# Patient Record
Sex: FEMALE | Race: BLACK OR AFRICAN AMERICAN | RURAL
[De-identification: names, ages, dates, MRNs, and addresses within clinical notes are randomized per-mention and may not be internally consistent; named-entity substitution may affect disease eponyms.]

---

## 2023-01-10 ENCOUNTER — OFFICE VISIT (OUTPATIENT)
Dept: FAMILY MEDICINE CLINIC | Age: 58
End: 2023-01-10
Payer: COMMERCIAL

## 2023-01-10 VITALS
HEART RATE: 75 BPM | SYSTOLIC BLOOD PRESSURE: 186 MMHG | TEMPERATURE: 96.7 F | HEIGHT: 65 IN | RESPIRATION RATE: 16 BRPM | DIASTOLIC BLOOD PRESSURE: 82 MMHG | BODY MASS INDEX: 48.82 KG/M2 | WEIGHT: 293 LBS | OXYGEN SATURATION: 94 %

## 2023-01-10 DIAGNOSIS — Z13.220 SCREENING FOR LIPID DISORDERS: ICD-10-CM

## 2023-01-10 DIAGNOSIS — J45.909 UNCOMPLICATED ASTHMA, UNSPECIFIED ASTHMA SEVERITY, UNSPECIFIED WHETHER PERSISTENT: ICD-10-CM

## 2023-01-10 DIAGNOSIS — Z11.59 NEED FOR HEPATITIS C SCREENING TEST: ICD-10-CM

## 2023-01-10 DIAGNOSIS — G47.00 INSOMNIA, UNSPECIFIED TYPE: ICD-10-CM

## 2023-01-10 DIAGNOSIS — Z13.31 POSITIVE DEPRESSION SCREENING: ICD-10-CM

## 2023-01-10 DIAGNOSIS — E11.9 TYPE 2 DIABETES MELLITUS WITHOUT COMPLICATION, WITHOUT LONG-TERM CURRENT USE OF INSULIN (HCC): ICD-10-CM

## 2023-01-10 DIAGNOSIS — I10 BENIGN HYPERTENSION: Primary | ICD-10-CM

## 2023-01-10 DIAGNOSIS — Z79.899 ENCOUNTER FOR LONG-TERM (CURRENT) USE OF OTHER MEDICATIONS: ICD-10-CM

## 2023-01-10 LAB — HBA1C MFR BLD HPLC: 7.2 %

## 2023-01-10 PROCEDURE — 3077F SYST BP >= 140 MM HG: CPT | Performed by: FAMILY MEDICINE

## 2023-01-10 PROCEDURE — 3051F HG A1C>EQUAL 7.0%<8.0%: CPT | Performed by: FAMILY MEDICINE

## 2023-01-10 PROCEDURE — 99204 OFFICE O/P NEW MOD 45 MIN: CPT | Performed by: FAMILY MEDICINE

## 2023-01-10 PROCEDURE — 83036 HEMOGLOBIN GLYCOSYLATED A1C: CPT | Performed by: FAMILY MEDICINE

## 2023-01-10 PROCEDURE — 3079F DIAST BP 80-89 MM HG: CPT | Performed by: FAMILY MEDICINE

## 2023-01-10 RX ORDER — TRAZODONE HYDROCHLORIDE 100 MG/1
TABLET ORAL
COMMUNITY
Start: 2022-11-28 | End: 2023-01-10 | Stop reason: ALTCHOICE

## 2023-01-10 RX ORDER — HYDROXYZINE 25 MG/1
25 TABLET, FILM COATED ORAL
Qty: 30 TABLET | Refills: 1 | Status: SHIPPED | OUTPATIENT
Start: 2023-01-10

## 2023-01-10 RX ORDER — MONTELUKAST SODIUM 10 MG/1
TABLET ORAL
COMMUNITY
Start: 2022-11-28 | End: 2023-01-10

## 2023-01-10 RX ORDER — HYDROCHLOROTHIAZIDE 12.5 MG/1
12.5 TABLET ORAL DAILY
Qty: 30 TABLET | Refills: 1 | Status: SHIPPED | OUTPATIENT
Start: 2023-01-10

## 2023-01-10 RX ORDER — DULAGLUTIDE 1.5 MG/.5ML
1.5 INJECTION, SOLUTION SUBCUTANEOUS
Qty: 4 EACH | Refills: 5 | Status: SHIPPED | OUTPATIENT
Start: 2023-01-10

## 2023-01-10 RX ORDER — PAROXETINE 10 MG/1
10 TABLET, FILM COATED ORAL DAILY
Qty: 30 TABLET | Refills: 2 | Status: SHIPPED | OUTPATIENT
Start: 2023-01-10

## 2023-01-10 RX ORDER — LOSARTAN POTASSIUM 50 MG/1
TABLET ORAL
COMMUNITY
Start: 2022-11-28

## 2023-01-10 RX ORDER — POLYETHYLENE GLYCOL 3350 17 G/17G
17 POWDER, FOR SOLUTION ORAL
COMMUNITY
Start: 2022-07-12

## 2023-01-10 NOTE — PROGRESS NOTES
[facility]    History of Present Illness:   Lana Lemus is a 65 y.o. female here for   Chief Complaint   Patient presents with    Establish Care         HPI:  Here to establish care for htn. Avg 130s-140s bp at home. She has not taken her losartan yet today. She says she was previously treated with hydrochlorothiazide and hydralazine as well. She last saw eye doctor 5 months ago and was told she was prediabetic. She was given metformin but it caused diarrhea. She denies any family history of thyroid cancer. She has a history of anxiety and was given Zoloft in the past but it caused palpitations. She took Paxil previously and tolerated that well. She says she takes trazodone for sleep. She had a sleep study done recently at [place]. They told her she could use CPAP but she decided against it. She says she has trouble staying asleep not falling asleep. Health Maintenance  Health Maintenance Due   Topic Date Due    Hepatitis C Screening  Never done    Pneumococcal 0-64 years (1 - PCV) Never done    Depression Monitoring  Never done    Cervical cancer screen  Never done    DTaP/Tdap/Td series (1 - Tdap) 07/06/2006    Lipid Screen  Never done    Colorectal Cancer Screening Combo  Never done    Shingles Vaccine (1 of 2) Never done    Breast Cancer Screen Mammogram  Never done    COVID-19 Vaccine (3 - Booster) 03/05/2021    Flu Vaccine (1) 08/01/2022       Past Medical, Family, and Social History:     Past Medical History:   Diagnosis Date    Asthma     Depression     Hypertension     Migraine headache       Past Surgical History:   Procedure Laterality Date    HX APPENDECTOMY      HX CHOLECYSTECTOMY      HX GYN         Current Outpatient Medications   Medication Sig Dispense Refill    losartan (COZAAR) 50 mg tablet TAKE ONE TABLET BY MOUTH EVERY DAY FOR BLOOD PRESSURE control FOR 90 DAYS      polyethylene glycol (MIRALAX) 17 gram/dose powder Take 17 g by mouth.          2 1       0    trazodone 100 mg qhs for sleep  5       1    FLUTICASONE PROPIONATE (FLUTICASONE IN) Take  by inhalation. BUDESONIDE/FORMOTEROL FUMARATE (SYMBICORT IN) Take  by inhalation. ALBUTEROL SULFATE (VENTOLIN IN) Take  by inhalation. Patient Active Problem List   Diagnosis Code    Migraine headache G43.909    Depression F32. A    Benign hypertension I10    Asthma J45.909    Obstructive sleep apnea (adult) (pediatric) G47.33    Type 2 diabetes mellitus without complication, without long-term current use of insulin (HCC) E11.9    Insomnia G47.00       Social History     Socioeconomic History    Marital status:    Tobacco Use    Smoking status: Never    Smokeless tobacco: Never   Substance and Sexual Activity    Alcohol use: No    Drug use: Not Currently     Social Determinants of Health     Financial Resource Strain: Low Risk     Difficulty of Paying Living Expenses: Not hard at all   Food Insecurity: No Food Insecurity    Worried About Running Out of Food in the Last Year: Never true    Ran Out of Food in the Last Year: Never true        Review of Systems   Review of Systems   Constitutional:  Negative for chills and fever. Respiratory:  Positive for shortness of breath. Negative for cough and wheezing. Cardiovascular:  Negative for chest pain. Gastrointestinal:  Negative for abdominal pain.      Objective:   Visit Vitals  BP (!) 186/82   Pulse 75   Temp (!) 96.7 °F (35.9 °C) (Oral)   Resp 16   Ht 5' 5\" (1.651 m)   Wt 298 lb 3.2 oz (135.3 kg)   SpO2 94%   BMI 49.62 kg/m²        Physical Exam  PHYSICAL EXAM:  Gen: Pt sitting in chair, in NAD  Head: Normocephalic, atraumatic  Eyes: Sclera anicteric, EOM grossly intact,  Ears: TM's pearly with good light reflex b/l  Neck: Supple, no LAD, no thyromegaly or carotid bruits  CVS: Normal S1, S2, no m/r/g  Resp: CTAB, no wheezes or rales  Abd: Soft, non-tender, non-distended, +BS  Neuro: Alert, oriented, appropriate    Pertinent Labs/Studies:  3 most recent PHQ Screens 1/10/2023   Little interest or pleasure in doing things More than half the days   Feeling down, depressed, irritable, or hopeless More than half the days   Total Score PHQ 2 4   Trouble falling or staying asleep, or sleeping too much Several days   Feeling tired or having little energy More than half the days   Poor appetite, weight loss, or overeating Several days   Feeling bad about yourself - or that you are a failure or have let yourself or your family down Not at all   Trouble concentrating on things such as school, work, reading, or watching TV Several days   Moving or speaking so slowly that other people could have noticed; or the opposite being so fidgety that others notice More than half the days   Thoughts of being better off dead, or hurting yourself in some way Not at all   PHQ 9 Score 11   How difficult have these problems made it for you to do your work, take care of your home and get along with others Not difficult at all      Testing preformed onsite at today's visit:  Results for orders placed or performed in visit on 01/10/23   AMB POC HEMOGLOBIN A1C   Result Value Ref Range    Hemoglobin A1c (POC) 7.2 %       Assessment and orders:       ICD-10-CM ICD-9-CM    1. Benign hypertension  I10 401.1 losartan (COZAAR) 50 mg tablet      hydroCHLOROthiazide (HYDRODIURIL) 12.5 mg tablet      2. Type 2 diabetes mellitus without complication, without long-term current use of insulin (HCC)  E11.9 250.00 AMB POC HEMOGLOBIN A1C      COLLECTION CAPILLARY BLOOD SPECIMEN      dulaglutide (Trulicity) 1.5 QS/0.1 mL sub-q pen      3. Insomnia, unspecified type  G47.00 780.52 hydrOXYzine HCL (ATARAX) 25 mg tablet      4. Positive depression screening  Z13.31 796.4 PARoxetine (PAXIL) 10 mg tablet      5. Need for hepatitis C screening test  Z11.59 V73.89 HEPATITIS C AB      HEPATITIS C AB      6.  Encounter for long-term (current) use of other medications Z79.899 V58.69 CBC WITH AUTOMATED DIFF      METABOLIC PANEL, COMPREHENSIVE      METABOLIC PANEL, COMPREHENSIVE      CBC WITH AUTOMATED DIFF      7. Screening for lipid disorders  Z13.220 V77.91 LIPID PANEL      LIPID PANEL      8. Uncomplicated asthma, unspecified asthma severity, unspecified whether persistent  J45.909 493.90 pneumococcal 20-sunny conj-dip, PF, (PREVNAR 20) 0.5 mL syrg injection        Diagnoses and all orders for this visit:    1. Benign hypertension  Assessment & Plan:   uncontrolled, changes made today: Add hydrochlorothiazide    Orders:  -     hydroCHLOROthiazide (HYDRODIURIL) 12.5 mg tablet; Take 1 Tablet by mouth daily. 2. Type 2 diabetes mellitus without complication, without long-term current use of insulin ([facility]  Assessment & Plan:   borderline controlled, changes made today: Trial of Trulicity if insurance will cover. Failed metformin due to side effects     GLP-1 agonists are contraindicated in patients with a personal or family history of MTC or in patients with Multiple Endocrine Neoplasia syndrome type 2 (MEN 2). Counseled patient regarding the potential risk for MTC with the use of semaglutide and informed them of symptoms of thyroid tumors (eg, a mass in the neck, dysphagia, dyspnea, persistent hoarseness). Orders:  -     AMB POC HEMOGLOBIN A1C  -     COLLECTION CAPILLARY BLOOD SPECIMEN  -     dulaglutide (Trulicity) 1.5 VV/8.6 mL sub-q pen; 0.5 mL by SubCUTAneous route every seven (7) days. Indications: type 2 diabetes mellitus    3. Insomnia, unspecified type  -     hydrOXYzine HCL (ATARAX) 25 mg tablet; Take 1 Tablet by mouth nightly as needed for Sleep. 4. Positive depression screening  -     PARoxetine (PAXIL) 10 mg tablet; Take 1 Tablet by mouth daily. 5. Need for hepatitis C screening test  -     HEPATITIS C AB; Future    6. Encounter for long-term (current) use of other medications  -     CBC WITH AUTOMATED DIFF;  Future  -     METABOLIC PANEL, COMPREHENSIVE; Future    7. Screening for lipid disorders  -     LIPID PANEL; Future    8. Uncomplicated asthma, unspecified asthma severity, unspecified whether persistent  -     pneumococcal 20-sunny conj-dip, PF, (PREVNAR 20) 0.5 mL syrg injection; 0.5 mL by IntraMUSCular route once for 1 dose. Follow-up and Dispositions    Return in about 2 weeks (around 1/24/2023) for Hypertension. the following changes in treatment are made: Add hydrochlorothiazide back, stop trazodone and start Paxil and hydroxyzine. Start Trulicity    lab results and schedule of future lab studies reviewed with patient  reviewed diet, exercise and weight control  reviewed medications and side effects in detail  specific diabetic recommendations: low cholesterol diet, weight control and daily exercise discussed, all medications, side effects and compliance discussed carefully, and glycohemoglobin and other lab monitoring discussed  Signed for records release today. I have discussed the diagnosis with the patient and the intended plan as seen in the above orders. Social history, medical history, and labs were reviewed. The patient has received an after-visit summary and questions were answered concerning future plans. I have discussed medication side effects and warnings with the patient as well. Patient/guardian verbalized understanding and accepts plan & risks. Please note that this dictation was completed with [employer], the computer voice recognition software. Quite often unanticipated grammatical, syntax, homophones, and other interpretive errors are inadvertently transcribed by the computer software. Please disregard these errors. Please excuse any errors that have escaped final proofreading. Thank you.      MD JIMENEZ Camejo & RAJENDRA CRUZ [facility]  01/10/23      Depression screen positive, PHQ 9 Score: 11, additional evaluation and assessment performed, follow-up plan includes but not limited to: Medication management

## 2023-01-10 NOTE — ASSESSMENT & PLAN NOTE
borderline controlled, changes made today: Trial of Trulicity if insurance will cover. Failed metformin due to side effects     GLP-1 agonists are contraindicated in patients with a personal or family history of MTC or in patients with Multiple Endocrine Neoplasia syndrome type 2 (MEN 2). Counseled patient regarding the potential risk for MTC with the use of semaglutide and informed them of symptoms of thyroid tumors (eg, a mass in the neck, dysphagia, dyspnea, persistent hoarseness).

## 2023-01-10 NOTE — PATIENT INSTRUCTIONS
DASH Diet: Care Instructions  Your Care Instructions     The DASH diet is an eating plan that can help lower your blood pressure. DASH stands for Dietary Approaches to Stop Hypertension. Hypertension is high blood pressure. The DASH diet focuses on eating foods that are high in calcium, potassium, and magnesium. These nutrients can lower blood pressure. The foods that are highest in these nutrients are fruits, vegetables, low-fat dairy products, nuts, seeds, and legumes. But taking calcium, potassium, and magnesium supplements instead of eating foods that are high in those nutrients does not have the same effect. The DASH diet also includes whole grains, fish, and poultry. The DASH diet is one of several lifestyle changes your doctor may recommend to lower your high blood pressure. Your doctor may also want you to decrease the amount of sodium in your diet. Lowering sodium while following the DASH diet can lower blood pressure even further than just the DASH diet alone. Follow-up care is a key part of your treatment and safety. Be sure to make and go to all appointments, and call your doctor if you are having problems. It's also a good idea to know your test results and keep a list of the medicines you take. How can you care for yourself at home? Following the DASH diet  Eat 4 to 5 servings of fruit each day. A serving is 1 medium-sized piece of fruit, ½ cup chopped or canned fruit, 1/4 cup dried fruit, or 4 ounces (½ cup) of fruit juice. Choose fruit more often than fruit juice. Eat 4 to 5 servings of vegetables each day. A serving is 1 cup of lettuce or raw leafy vegetables, ½ cup of chopped or cooked vegetables, or 4 ounces (½ cup) of vegetable juice. Choose vegetables more often than vegetable juice. Get 2 to 3 servings of low-fat and fat-free dairy each day. A serving is 8 ounces of milk, 1 cup of yogurt, or 1 ½ ounces of cheese. Eat 6 to 8 servings of grains each day.  A serving is 1 slice of bread, 1 ounce of dry cereal, or ½ cup of cooked rice, pasta, or cooked cereal. Try to choose whole-grain products as much as possible. Limit lean meat, poultry, and fish to 2 servings each day. A serving is 3 ounces, about the size of a deck of cards. Eat 4 to 5 servings of nuts, seeds, and legumes (cooked dried beans, lentils, and split peas) each week. A serving is 1/3 cup of nuts, 2 tablespoons of seeds, or ½ cup of cooked beans or peas. Limit fats and oils to 2 to 3 servings each day. A serving is 1 teaspoon of vegetable oil or 2 tablespoons of salad dressing. Limit sweets and added sugars to 5 servings or less a week. A serving is 1 tablespoon jelly or jam, ½ cup sorbet, or 1 cup of lemonade. Eat less than 2,300 milligrams (mg) of sodium a day. If you limit your sodium to 1,500 mg a day, you can lower your blood pressure even more. Be aware that all of these are the suggested number of servings for people who eat 1,800 to 2,000 calories a day. Your recommended number of servings may be different if you need more or fewer calories. Tips for success  Start small. Do not try to make dramatic changes to your diet all at once. You might feel that you are missing out on your favorite foods and then be more likely to not follow the plan. Make small changes, and stick with them. Once those changes become habit, add a few more changes. Try some of the following:  Make it a goal to eat a fruit or vegetable at every meal and at snacks. This will make it easy to get the recommended amount of fruits and vegetables each day. Try yogurt topped with fruit and nuts for a snack or healthy dessert. Add lettuce, tomato, cucumber, and onion to sandwiches. Combine a ready-made pizza crust with low-fat mozzarella cheese and lots of vegetable toppings. Try using tomatoes, squash, spinach, broccoli, carrots, cauliflower, and onions.   Have a variety of cut-up vegetables with a low-fat dip as an appetizer instead of chips and dip.  Sprinkle sunflower seeds or chopped almonds over salads. Or try adding chopped walnuts or almonds to cooked vegetables. Try some vegetarian meals using beans and peas. Add garbanzo or kidney beans to salads. Make burritos and tacos with mashed machado beans or black beans. Where can you learn more? Go to http://www.sotelo.com/  Enter H967 in the search box to learn more about \"DASH Diet: Care Instructions. \"  Current as of: January 10, 2022               Content Version: 13.4  © 6495-7561 [employer]. Care instructions adapted under license by [employer] (which disclaims liability or warranty for this information). If you have questions about a medical condition or this instruction, always ask your healthcare professional. [address] any warranty or liability for your use of this information.

## 2023-01-10 NOTE — PROGRESS NOTES
1. Have you been to the ER, urgent care clinic since your last visit? Hospitalized since your last visit? No    2. Have you seen or consulted any other health care providers outside of the [address] [place] [address] since your last visit? Include any pap smears or colon screening. Yes When: [facility], dr Orlando Chaudhari gastro [facility]     3. For patients aged 39-70: Has the patient had a colonoscopy / FIT/ Cologuard? Yes - dr Hamida Rodriguez     If the patient is female:    4. For patients aged 41-77: Has the patient had a mammogram within the past 2 years? [address]       5. For patients aged 21-65: Has the patient had a pap smear? [address]  last 5 years     Reviewed record in preparation for visit and have necessary documentation  Pt did not bring medication to office visit for review  Patient is accompanied by self I have received verbal consent from Earnesteen Presume to discuss any/all medical information while they are present in the room.     Goals that were addressed and/or need to be completed during or after this appointment include     Health Maintenance Due   Topic Date Due    Hepatitis C Screening  Never done    COVID-19 Vaccine (1) Never done    Pneumococcal 0-64 years (1 - PCV) Never done    Depression Monitoring  Never done    Cervical cancer screen  Never done    DTaP/Tdap/Td series (1 - Tdap) 07/06/2006    Lipid Screen  Never done    Colorectal Cancer Screening Combo  Never done    Shingles Vaccine (1 of 2) Never done    Breast Cancer Screen Mammogram  Never done    Flu Vaccine (1) 08/01/2022

## 2023-01-11 LAB
ALBUMIN SERPL-MCNC: 3.9 G/DL (ref 3.5–5)
ALBUMIN/GLOB SERPL: 1.2 (ref 1.1–2.2)
ALP SERPL-CCNC: 102 U/L (ref 45–117)
ALT SERPL-CCNC: 26 U/L (ref 12–78)
ANION GAP SERPL CALC-SCNC: 4 MMOL/L (ref 5–15)
AST SERPL-CCNC: 15 U/L (ref 15–37)
BASOPHILS # BLD: 0.1 K/UL (ref 0–0.1)
BASOPHILS NFR BLD: 1 % (ref 0–1)
BILIRUB SERPL-MCNC: 0.6 MG/DL (ref 0.2–1)
BUN SERPL-MCNC: 12 MG/DL (ref 6–20)
BUN/CREAT SERPL: 14 (ref 12–20)
CALCIUM SERPL-MCNC: 9.6 MG/DL (ref 8.5–10.1)
CHLORIDE SERPL-SCNC: 106 MMOL/L (ref 97–108)
CHOLEST SERPL-MCNC: 247 MG/DL
CO2 SERPL-SCNC: 30 MMOL/L (ref 21–32)
CREAT SERPL-MCNC: 0.84 MG/DL (ref 0.55–1.02)
DIFFERENTIAL METHOD BLD: ABNORMAL
EOSINOPHIL # BLD: 0.1 K/UL (ref 0–0.4)
EOSINOPHIL NFR BLD: 1 % (ref 0–7)
ERYTHROCYTE [DISTWIDTH] IN BLOOD BY AUTOMATED COUNT: 15.2 % (ref 11.5–14.5)
GLOBULIN SER CALC-MCNC: 3.2 G/DL (ref 2–4)
GLUCOSE SERPL-MCNC: 110 MG/DL (ref 65–100)
HCT VFR BLD AUTO: 41.4 % (ref 35–47)
HCV AB SERPL QL IA: NONREACTIVE
HDLC SERPL-MCNC: 57 MG/DL
HDLC SERPL: 4.3 (ref 0–5)
HGB BLD-MCNC: 12.9 G/DL (ref 11.5–16)
IMM GRANULOCYTES # BLD AUTO: 0 K/UL (ref 0–0.04)
IMM GRANULOCYTES NFR BLD AUTO: 0 % (ref 0–0.5)
LDLC SERPL CALC-MCNC: 173 MG/DL (ref 0–100)
LYMPHOCYTES # BLD: 1.6 K/UL (ref 0.8–3.5)
LYMPHOCYTES NFR BLD: 23 % (ref 12–49)
MCH RBC QN AUTO: 25.4 PG (ref 26–34)
MCHC RBC AUTO-ENTMCNC: 31.2 G/DL (ref 30–36.5)
MCV RBC AUTO: 81.7 FL (ref 80–99)
MONOCYTES # BLD: 0.3 K/UL (ref 0–1)
MONOCYTES NFR BLD: 5 % (ref 5–13)
NEUTS SEG # BLD: 4.9 K/UL (ref 1.8–8)
NEUTS SEG NFR BLD: 70 % (ref 32–75)
NRBC # BLD: 0 K/UL (ref 0–0.01)
NRBC BLD-RTO: 0 PER 100 WBC
PLATELET # BLD AUTO: 305 K/UL (ref 150–400)
PMV BLD AUTO: 11.6 FL (ref 8.9–12.9)
POTASSIUM SERPL-SCNC: 4.5 MMOL/L (ref 3.5–5.1)
PROT SERPL-MCNC: 7.1 G/DL (ref 6.4–8.2)
RBC # BLD AUTO: 5.07 M/UL (ref 3.8–5.2)
SODIUM SERPL-SCNC: 140 MMOL/L (ref 136–145)
TRIGL SERPL-MCNC: 85 MG/DL (ref ?–150)
VLDLC SERPL CALC-MCNC: 17 MG/DL
WBC # BLD AUTO: 6.9 K/UL (ref 3.6–11)

## 2023-01-12 ENCOUNTER — TELEPHONE (OUTPATIENT)
Dept: FAMILY MEDICINE CLINIC | Age: 58
End: 2023-01-12

## 2023-01-12 DIAGNOSIS — E78.2 MIXED HYPERLIPIDEMIA: Primary | ICD-10-CM

## 2023-01-12 RX ORDER — ATORVASTATIN CALCIUM 20 MG/1
20 TABLET, FILM COATED ORAL DAILY
Qty: 30 TABLET | Refills: 5 | Status: SHIPPED | OUTPATIENT
Start: 2023-01-12

## 2023-01-12 NOTE — TELEPHONE ENCOUNTER
The 10-year ASCVD risk score (Shane HACKETT, et al., 2019) is: 42.1%   Your cholesterol test was elevated, follow a low-cholesterol diet and exercise. Called in atorvastatin, patient aware.

## 2023-01-22 PROBLEM — E66.01 CLASS 3 SEVERE OBESITY WITH SERIOUS COMORBIDITY IN ADULT (HCC): Status: ACTIVE | Noted: 2023-01-22

## 2023-01-22 PROBLEM — E66.813 CLASS 3 SEVERE OBESITY WITH SERIOUS COMORBIDITY IN ADULT: Status: ACTIVE | Noted: 2023-01-22

## 2023-01-22 PROBLEM — F41.9 ANXIETY: Status: ACTIVE | Noted: 2023-01-22

## 2023-01-24 ENCOUNTER — OFFICE VISIT (OUTPATIENT)
Dept: FAMILY MEDICINE CLINIC | Age: 58
End: 2023-01-24
Payer: COMMERCIAL

## 2023-01-24 VITALS
TEMPERATURE: 97 F | WEIGHT: 287.6 LBS | HEART RATE: 74 BPM | RESPIRATION RATE: 22 BRPM | DIASTOLIC BLOOD PRESSURE: 73 MMHG | HEIGHT: 65 IN | BODY MASS INDEX: 47.92 KG/M2 | SYSTOLIC BLOOD PRESSURE: 135 MMHG | OXYGEN SATURATION: 98 %

## 2023-01-24 DIAGNOSIS — E11.9 TYPE 2 DIABETES MELLITUS WITHOUT COMPLICATION, WITHOUT LONG-TERM CURRENT USE OF INSULIN (HCC): Primary | ICD-10-CM

## 2023-01-24 DIAGNOSIS — Z23 ENCOUNTER FOR IMMUNIZATION: ICD-10-CM

## 2023-01-24 DIAGNOSIS — I10 BENIGN HYPERTENSION: ICD-10-CM

## 2023-01-24 LAB
ALBUMIN UR QL STRIP: 10 MG/L
CREATININE, URINE POC: 100 MG/DL
MICROALBUMIN/CREAT RATIO POC: <30 MG/G

## 2023-01-24 PROCEDURE — 3075F SYST BP GE 130 - 139MM HG: CPT | Performed by: FAMILY MEDICINE

## 2023-01-24 PROCEDURE — 82044 UR ALBUMIN SEMIQUANTITATIVE: CPT | Performed by: FAMILY MEDICINE

## 2023-01-24 PROCEDURE — 90471 IMMUNIZATION ADMIN: CPT | Performed by: FAMILY MEDICINE

## 2023-01-24 PROCEDURE — 3051F HG A1C>EQUAL 7.0%<8.0%: CPT | Performed by: FAMILY MEDICINE

## 2023-01-24 PROCEDURE — 99214 OFFICE O/P EST MOD 30 MIN: CPT | Performed by: FAMILY MEDICINE

## 2023-01-24 PROCEDURE — 90715 TDAP VACCINE 7 YRS/> IM: CPT | Performed by: FAMILY MEDICINE

## 2023-01-24 PROCEDURE — 3078F DIAST BP <80 MM HG: CPT | Performed by: FAMILY MEDICINE

## 2023-01-24 PROCEDURE — 90746 HEPB VACCINE 3 DOSE ADULT IM: CPT | Performed by: FAMILY MEDICINE

## 2023-01-24 PROCEDURE — 90472 IMMUNIZATION ADMIN EACH ADD: CPT | Performed by: FAMILY MEDICINE

## 2023-01-24 RX ORDER — ZOSTER VACCINE RECOMBINANT, ADJUVANTED 50 MCG/0.5
0.5 KIT INTRAMUSCULAR ONCE
Qty: 0.5 ML | Refills: 1 | Status: SHIPPED | OUTPATIENT
Start: 2023-01-24 | End: 2023-01-24

## 2023-01-24 RX ORDER — DULAGLUTIDE 3 MG/.5ML
3 INJECTION, SOLUTION SUBCUTANEOUS
Qty: 2 EACH | Refills: 5 | Status: SHIPPED | OUTPATIENT
Start: 2023-01-24

## 2023-01-24 NOTE — PATIENT INSTRUCTIONS
RECOMMENDATIONS given include: Recommended increased dietary fluid for constipation. Advised colace, fiber every day; use senna and miralax prn. F/U with MD if symptoms worsen or fail to resolve or for new associated symptoms.

## 2023-01-24 NOTE — ASSESSMENT & PLAN NOTE
well controlled, changes made today: increase trulicity     Lab Results   Component Value Date/Time    Hemoglobin A1c (POC) 7.2 01/10/2023 10:30 AM

## 2023-01-24 NOTE — PROGRESS NOTES
[facility]    History of Present Illness:   Annice Gaucher is a 65 y.o. female here for   Chief Complaint   Patient presents with    Follow-up     2 week HTN         HPI:  Here for follow-up diabetes and hypertension. She was a new patient to me last office visit and we made multiple changes. Added hydrochlorothiazide and she is tolerating that well. Blood pressure readings at home have started to come down. Added Trulicity to help with diabetes as well as weight loss. She is down 9 pounds in the past 2 weeks. She is tolerating that well. Her 10-year cardiovascular risk was 42% so started her on atorvastatin. Also stopped her trazodone and started on Paxil and hydroxyzine. Her only complaint there is that she is a little tired when she wakes up. She had her eye exam done in [place] Dr. Nico Jennings, colonoscopy with Dr. Stephenie Osborne in [place] and Pap smear last at [facility]. We do not have those results today. She is due for multiple vaccines.   Health Maintenance  Health Maintenance Due   Topic Date Due    Pneumococcal 0-64 years (1 - PCV) Never done    Eye Exam Retinal or Dilated  Never done    Cervical cancer screen  Never done    Shingles Vaccine (1 of 2) Never done    COVID-19 Vaccine (3 - Booster) 03/05/2021    Flu Vaccine (1) 08/01/2022       Past Medical, Family, and Social History:     Past Medical History:   Diagnosis Date    Allergic rhinitis     Asthma     Depression     Fatty liver     GERD (gastroesophageal reflux disease)     Hyperlipidemia     Hypertension     Insomnia     trazodone, melatonin    Migraine headache     ANDRES (obstructive sleep apnea)     Type 2 diabetes mellitus without complication, without long-term current use of insulin ([facility] [address] 01/10/2023    Vitamin D deficiency       Past Surgical History:   Procedure Laterality Date    HX APPENDECTOMY      HX CHOLECYSTECTOMY      HX GYN         Current Outpatient Medications on File Prior to Visit Medication Sig Dispense Refill    atorvastatin (LIPITOR) 20 mg tablet Take 1 Tablet by mouth daily. 30 Tablet 5    losartan (COZAAR) 50 mg tablet TAKE ONE TABLET BY MOUTH EVERY DAY FOR BLOOD PRESSURE control FOR 90 DAYS      polyethylene glycol (MIRALAX) 17 gram/dose powder Take 17 g by mouth. PARoxetine (PAXIL) 10 mg tablet Take 1 Tablet by mouth daily. 30 Tablet 2    hydroCHLOROthiazide (HYDRODIURIL) 12.5 mg tablet Take 1 Tablet by mouth daily. 30 Tablet 1    hydrOXYzine HCL (ATARAX) 25 mg tablet Take 1 Tablet by mouth nightly as needed for Sleep. 30 Tablet 1    FLUTICASONE PROPIONATE (FLUTICASONE IN) Take  by inhalation. BUDESONIDE/FORMOTEROL FUMARATE (SYMBICORT IN) Take  by inhalation. ALBUTEROL SULFATE (VENTOLIN IN) Take  by inhalation. [DISCONTINUED] dulaglutide (Trulicity) 1.5 LP/4.7 mL sub-q pen 0.5 mL by SubCUTAneous route every seven (7) days. Indications: type 2 diabetes mellitus 4 Each 5     No current facility-administered medications on file prior to visit. Patient Active Problem List   Diagnosis Code    Migraine headache G43.909    Depression F32. A    Benign hypertension I10    Asthma J45.909    Obstructive sleep apnea (adult) (pediatric) G47.33    Type 2 diabetes mellitus without complication, without long-term current use of insulin ([facility]) E11.9    Insomnia G47.00    Class 3 severe obesity with serious comorbidity in adult [facility]) E66.01    Anxiety F41.9       Social History     Socioeconomic History    Marital status:    Tobacco Use    Smoking status: Never    Smokeless tobacco: Never   Substance and Sexual Activity    Alcohol use: No    Drug use: Not Currently     Social Determinants of Health     Financial Resource Strain: Low Risk     Difficulty of Paying Living Expenses: Not hard at all   Food Insecurity: No Food Insecurity    Worried About Running Out of Food in the Last Year: Never true    Ran Out of Food in the Last Year: Never true        Review of Systems   Review of Systems   Constitutional:  Negative for chills and fever. Respiratory:  Negative for cough and shortness of breath. Cardiovascular:  Negative for chest pain. Gastrointestinal:  Positive for constipation. Objective:   Visit Vitals  /73 (BP 1 Location: Right upper arm, BP Patient Position: Sitting, BP Cuff Size: Large adult)   Pulse 74   Temp 97 °F (36.1 °C) (Oral)   Resp 22   Ht 5' 5\" (1.651 m)   Wt 287 lb 9.6 oz (130.5 kg)   SpO2 98%   BMI 47.86 kg/m²        Physical Exam  PHYSICAL EXAM:  Gen: Pt sitting in chair, in NAD, morbidly obese  Head: Normocephalic, atraumatic  Eyes: Sclera anicteric, EOM grossly intact,  CVS: Normal S1, S2, no m/r/g  Resp: CTAB, no wheezes or rales  Extrem: Atraumatic, no cyanosis or edema  Pulses: 2+   Skin: Warm, dry  Neuro: Alert, oriented, appropriate  Diabetic foot exam:        Left Foot:   Visual Exam: normal    Pulse DP: 2+ (normal)   Filament test: normal sensation          Right Foot:   Visual Exam: normal    Pulse DP: 2+ (normal)   Filament test: normal sensation         Pertinent Labs/Studies:   Testing preformed onsite at today's visit:  Results for orders placed or performed in visit on 01/24/23   AMB POC URINE, MICROALBUMIN, SEMIQUANT (3 RESULTS)   Result Value Ref Range    ALBUMIN, URINE POC 10 Negative mg/L    CREATININE, URINE  mg/dL    Microalbumin/creat ratio (POC) <30 <30 MG/G       Assessment and orders:     Encounter Diagnoses   Name Primary? Type 2 diabetes mellitus without complication, without long-term current use of insulin ([facility] [address] Yes    Encounter for immunization     Benign hypertension      Diagnoses and all orders for this visit:    1.  Type 2 diabetes mellitus without complication, without long-term current use of insulin ([facility])  Assessment & Plan:   well controlled, changes made today: increase trulicity     Lab Results   Component Value Date/Time    Hemoglobin A1c (POC) 7.2 01/10/2023 10:30 AM         Orders:  - AMB POC URINE, MICROALBUMIN, SEMIQUANT (3 RESULTS)  -      DIABETES FOOT EXAM  -     dulaglutide (Trulicity) 3 OC/5.8 mL pnij; 3 mg by SubCUTAneous route every seven (7) days. Replaces 1.5 mg dose    2. Encounter for immunization  -     TDAP, [address], (AGE 10 YRS+), IM  -     HEPATITIS B VACCINE, ADULT DOSAGE, IM    3. Benign hypertension  Assessment & Plan:   well controlled, continue current medications      Other orders  -     pneumococcal 20-sunny conj-dip, PF, (PREVNAR 20) 0.5 mL syrg injection; 0.5 mL by IntraMUSCular route once for 1 dose.  -     varicella-zoster recombinant, PF, (Shingrix, PF,) 50 mcg/0.5 mL susr injection; 0.5 mL by IntraMUSCular route once for 1 dose. Repeat in 2-6 months    Follow-up and Dispositions    Return in about 3 months (around 4/24/2023) for diabetes. the following changes in treatment are made: Increase Trulicity next month, consider reducing hydroxyzine dose due to sedation in the morning  lab results and schedule of future lab studies reviewed with patient  reviewed diet, exercise and weight control  reviewed medications and side effects in detail  specific diabetic recommendations: all medications, side effects and compliance discussed carefully, foot care discussed and Podiatry visits discussed, and annual eye examinations at Ophthalmology discussed    RECOMMENDATIONS given include: Recommended increased dietary fluid for constipation. Advised colace, fiber every day; use senna and miralax prn. F/U with MD if symptoms worsen or fail to resolve or for new associated symptoms. I have discussed the diagnosis with the patient and the intended plan as seen in the above orders. Social history, medical history, and labs were reviewed. The patient has received an after-visit summary and questions were answered concerning future plans. I have discussed medication side effects and warnings with the patient as well.  Patient/guardian verbalized understanding and accepts plan & risks. Please note that this dictation was completed with [employer], the computer voice recognition software. Quite often unanticipated grammatical, syntax, homophones, and other interpretive errors are inadvertently transcribed by the computer software. Please disregard these errors. Please excuse any errors that have escaped final proofreading. Thank you.      MD JIMENEZ Bee & RAJENDRA CRUZ [facility]  01/24/23

## 2023-01-24 NOTE — PROGRESS NOTES
1. \"Have you been to the ER, urgent care clinic since your last visit? Hospitalized since your last visit? \" No    2. \"Have you seen or consulted any other health care providers outside of the [address] [place] [address] since your last visit? \" No     3. For patients aged 39-70: Has the patient had a colonoscopy / FIT/ Cologuard? Yes - no Care Gap present      If the patient is female:    4. For patients aged 41-77: Has the patient had a mammogram within the past 2 years? Yes - no Care Gap present      5. For patients aged 21-65: Has the patient had a pap smear?  Yes - no Care Gap present    Health Maintenance Due   Topic Date Due    Pneumococcal 0-64 years (1 - PCV) Never done    Foot Exam Q1  Never done    Eye Exam Retinal or Dilated  Never done    Diabetic Alb to Cr ratio (uACR) test  Never done    Hepatitis B Vaccine (1 of 3 - Risk 3-dose series) Never done    Cervical cancer screen  Never done    DTaP/Tdap/Td series (1 - Tdap) 07/06/2006    Colorectal Cancer Screening Combo  Never done    Shingles Vaccine (1 of 2) Never done    Breast Cancer Screen Mammogram  Never done    COVID-19 Vaccine (3 - Booster) 03/05/2021    Flu Vaccine (1) 08/01/2022

## 2023-01-30 ENCOUNTER — TELEPHONE (OUTPATIENT)
Dept: FAMILY MEDICINE CLINIC | Age: 58
End: 2023-01-30

## 2023-01-30 NOTE — TELEPHONE ENCOUNTER
[facility] cannot get the TRULICITY. It is on a back order and they have no idea when it will arrive. Please call Pt with directions.  Thanks

## 2023-01-30 NOTE — TELEPHONE ENCOUNTER
Spoke with provider in regards to Trulicity being on back order. Patient advised that provider advised that it is on back order everywhere. She wants patient not to worry about medication right now. We are going to recheck labs when she comes back.

## 2023-03-16 DIAGNOSIS — Z13.31 POSITIVE DEPRESSION SCREENING: ICD-10-CM

## 2023-03-16 RX ORDER — PAROXETINE 10 MG/1
TABLET, FILM COATED ORAL
Qty: 30 TABLET | Refills: 2 | Status: SHIPPED | OUTPATIENT
Start: 2023-03-16

## 2023-04-24 ENCOUNTER — OFFICE VISIT (OUTPATIENT)
Dept: FAMILY MEDICINE CLINIC | Age: 58
End: 2023-04-24
Payer: COMMERCIAL

## 2023-04-24 VITALS
BODY MASS INDEX: 48.32 KG/M2 | SYSTOLIC BLOOD PRESSURE: 141 MMHG | HEART RATE: 67 BPM | DIASTOLIC BLOOD PRESSURE: 74 MMHG | WEIGHT: 290 LBS | HEIGHT: 65 IN | OXYGEN SATURATION: 96 % | RESPIRATION RATE: 20 BRPM | TEMPERATURE: 97.1 F

## 2023-04-24 DIAGNOSIS — J45.909 UNCOMPLICATED ASTHMA, UNSPECIFIED ASTHMA SEVERITY, UNSPECIFIED WHETHER PERSISTENT: ICD-10-CM

## 2023-04-24 DIAGNOSIS — E11.9 TYPE 2 DIABETES MELLITUS WITHOUT COMPLICATION, WITHOUT LONG-TERM CURRENT USE OF INSULIN (HCC): ICD-10-CM

## 2023-04-24 DIAGNOSIS — I10 BENIGN HYPERTENSION: Primary | ICD-10-CM

## 2023-04-24 LAB — GLUCOSE POC: 115 MG/DL

## 2023-04-24 PROCEDURE — 3078F DIAST BP <80 MM HG: CPT | Performed by: FAMILY MEDICINE

## 2023-04-24 PROCEDURE — 3077F SYST BP >= 140 MM HG: CPT | Performed by: FAMILY MEDICINE

## 2023-04-24 PROCEDURE — 82962 GLUCOSE BLOOD TEST: CPT | Performed by: FAMILY MEDICINE

## 2023-04-24 PROCEDURE — 99214 OFFICE O/P EST MOD 30 MIN: CPT | Performed by: FAMILY MEDICINE

## 2023-04-24 PROCEDURE — 3051F HG A1C>EQUAL 7.0%<8.0%: CPT | Performed by: FAMILY MEDICINE

## 2023-04-24 RX ORDER — INSULIN PUMP SYRINGE, 3 ML
EACH MISCELLANEOUS
Qty: 1 KIT | Refills: 0 | Status: SHIPPED | OUTPATIENT
Start: 2023-04-24

## 2023-04-24 RX ORDER — LOSARTAN POTASSIUM 50 MG/1
TABLET ORAL
Qty: 90 TABLET | Refills: 1 | Status: CANCELLED | OUTPATIENT
Start: 2023-04-24

## 2023-04-24 RX ORDER — MONTELUKAST SODIUM 10 MG/1
10 TABLET ORAL DAILY
Qty: 90 TABLET | Refills: 1 | Status: SHIPPED | OUTPATIENT
Start: 2023-04-24 | End: 2024-04-18

## 2023-04-24 RX ORDER — LANCETS
EACH MISCELLANEOUS
Qty: 100 EACH | Refills: 3 | Status: SHIPPED | OUTPATIENT
Start: 2023-04-24

## 2023-04-24 RX ORDER — BUDESONIDE AND FORMOTEROL FUMARATE DIHYDRATE 160; 4.5 UG/1; UG/1
2 AEROSOL RESPIRATORY (INHALATION) 2 TIMES DAILY
Qty: 10.2 G | Refills: 11 | Status: SHIPPED | OUTPATIENT
Start: 2023-04-24

## 2023-04-24 RX ORDER — MONTELUKAST SODIUM 10 MG/1
10 TABLET ORAL
COMMUNITY
Start: 2022-08-23 | End: 2023-04-24 | Stop reason: SDUPTHER

## 2023-04-24 RX ORDER — HYDROCHLOROTHIAZIDE 25 MG/1
25 TABLET ORAL DAILY
Qty: 90 TABLET | Refills: 1 | Status: SHIPPED | OUTPATIENT
Start: 2023-04-24

## 2023-04-24 RX ORDER — DULAGLUTIDE 3 MG/.5ML
3 INJECTION, SOLUTION SUBCUTANEOUS
Qty: 2 EACH | Refills: 5 | Status: SHIPPED | OUTPATIENT
Start: 2023-04-24

## 2023-04-24 NOTE — PROGRESS NOTES
1. \"Have you been to the ER, urgent care clinic since your last visit? Hospitalized since your last visit? \" No    2. \"Have you seen or consulted any other health care providers outside of the [address] [place] [address] since your last visit? \" No     3. For patients aged 39-70: Has the patient had a colonoscopy / FIT/ Cologuard? Yes - no Care Gap present      If the patient is female:    4. For patients aged 41-77: Has the patient had a mammogram within the past 2 years? Yes - no Care Gap present      5. For patients aged 21-65: Has the patient had a pap smear? Yes - Care Gap present.  Rooming MA/LPN to request most recent results     Health Maintenance Due   Topic Date Due    Pneumococcal 0-64 years (1 - PCV) Never done    Eye Exam Retinal or Dilated  Never done    Cervical cancer screen  Never done    Shingles Vaccine (1 of 2) Never done    COVID-19 Vaccine (3 - Booster) 03/05/2021    Hepatitis B Vaccine (2 of 3 - Risk 3-dose series) 02/21/2023       Eye - Dr. Richard Tavares

## 2023-04-24 NOTE — PROGRESS NOTES
[facility]    History of Present Illness:   Maikol Guerra is a 65 y.o. female here for   Chief Complaint   Patient presents with    Follow Up Chronic Condition         HPI:  Here for follow-up diabetes and hypertension. Recently added hydrochlorothiazide and she is tolerating that well. Added Trulicity to help with diabetes as well as weight loss. She has not been able to find the 3 mg dose. Her 10-year cardiovascular risk was 42% so started her on atorvastatin. Also stopped her trazodone and started on Paxil and hydroxyzine. She had her eye exam done in [place] Dr. Josse Lawson, colonoscopy with Dr. Patricio Castro in [place] and Pap smear last at [facility]. We do not have those results today. Health Maintenance  Health Maintenance Due   Topic Date Due    Pneumococcal 0-64 years (1 - PCV) Never done    Eye Exam Retinal or Dilated  Never done    Cervical cancer screen  Never done    Shingles Vaccine (1 of 2) Never done    COVID-19 Vaccine (3 - Booster) 03/05/2021    Hepatitis B Vaccine (2 of 3 - Risk 3-dose series) 02/21/2023       Past Medical, Family, and Social History:     Past Medical History:   Diagnosis Date    Allergic rhinitis     Asthma     Depression     Fatty liver     GERD (gastroesophageal reflux disease)     Hyperlipidemia     Hypertension     Insomnia     trazodone, melatonin    Migraine headache     ANDRES (obstructive sleep apnea)     Type 2 diabetes mellitus without complication, without long-term current use of insulin ([facility] [address] 01/10/2023    Vitamin D deficiency       Past Surgical History:   Procedure Laterality Date    HX APPENDECTOMY      HX CHOLECYSTECTOMY      HX GI      anal fistulectomy    HX GYN      salpingectomy for tubal ectopic pregnancy, BTL       Current Outpatient Medications   Medication Sig Dispense Refill    montelukast (SINGULAIR) 10 mg tablet Take 1 Tablet by mouth daily for 360 days.  90 Tablet 1    Blood-Glucose Meter monitoring kit Fsbs daily Dx E11.9 1 Kit 0    glucose blood VI test strips strip Check fsbs 100 Strip 3    lancets misc daily 100 Each 3    budesonide-formoteroL (SYMBICORT) 160-4.5 mcg/actuation HFAA Take 2 Puffs by inhalation two (2) times a day. 10.2 g 11    dulaglutide (Trulicity) 3 RI/0.1 mL pnij 3 mg by SubCUTAneous route every seven (7) days. Replaces 1.5 mg dose 2 Each 5    hydroCHLOROthiazide (HYDRODIURIL) 25 mg tablet Take 1 Tablet by mouth daily. Note higher dose 90 Tablet 1    PARoxetine (PAXIL) 10 mg tablet TAKE ONE TABLET BY MOUTH EVERY DAY 30 Tablet 2    atorvastatin (LIPITOR) 20 mg tablet Take 1 Tablet by mouth daily. 30 Tablet 5    losartan (COZAAR) 50 mg tablet TAKE ONE TABLET BY MOUTH EVERY DAY FOR BLOOD PRESSURE control FOR 90 DAYS      polyethylene glycol (MIRALAX) 17 gram/dose powder Take 17 g by mouth every other day.      hydrOXYzine HCL (ATARAX) 25 mg tablet Take 1 Tablet by mouth nightly as needed for Sleep. 30 Tablet 1    FLUTICASONE PROPIONATE (FLUTICASONE IN) Take  by inhalation. ALBUTEROL SULFATE (VENTOLIN IN) Take  by inhalation. Patient Active Problem List   Diagnosis Code    Migraine headache G43.909    Depression F32. A    Benign hypertension I10    Asthma J45.909    Obstructive sleep apnea (adult) (pediatric) G47.33    Type 2 diabetes mellitus without complication, without long-term current use of insulin ([facility]) E11.9    Insomnia G47.00    Class 3 severe obesity with serious comorbidity in adult [facility]) E66.01    Anxiety F41.9       Social History     Socioeconomic History    Marital status:    Tobacco Use    Smoking status: Never    Smokeless tobacco: Never   Substance and Sexual Activity    Alcohol use: No    Drug use: Not Currently     Social Determinants of Health     Financial Resource Strain: Low Risk     Difficulty of Paying Living Expenses: Not hard at all   Food Insecurity: No Food Insecurity    Worried About Running Out of Food in the Last Year: Never true    Ran Out of Food in the Last Year: Never true        Review of Systems   Review of Systems   Constitutional:  Negative for chills and fever. Respiratory:  Negative for cough and shortness of breath. Cardiovascular:  Negative for chest pain. Objective:   Visit Vitals  BP (!) 141/74   Pulse 67   Temp 97.1 °F (36.2 °C) (Oral)   Resp 20   Ht 5' 5\" (1.651 m)   Wt 290 lb (131.5 kg)   SpO2 96%   BMI 48.26 kg/m²        Physical Exam  PHYSICAL EXAM:  Gen: Pt sitting in chair, in NAD  Head: Normocephalic, atraumatic  Eyes: Sclera anicteric, EOM grossly intact,  CVS: Normal S1, S2, no m/r/g  Resp: CTAB, no wheezes or rales  Neuro: Alert, oriented, appropriate    Pertinent Labs/Studies:   Testing preformed onsite at today's visit:  Results for orders placed or performed in visit on 04/24/23   AMB POC GLUCOSE BLOOD, BY GLUCOSE MONITORING DEVICE   Result Value Ref Range    Glucose  MG/DL        Assessment and orders:       ICD-10-CM ICD-9-CM    1. Benign hypertension  I10 401.1 hydroCHLOROthiazide (HYDRODIURIL) 25 mg tablet      2. Uncomplicated asthma, unspecified asthma severity, unspecified whether persistent  J45.909 493.90 montelukast (SINGULAIR) 10 mg tablet      budesonide-formoteroL (SYMBICORT) 160-4.5 mcg/actuation HFAA      3. Type 2 diabetes mellitus without complication, without long-term current use of insulin ([facility])  E11.9 250.00 Blood-Glucose Meter monitoring kit      glucose blood VI test strips strip      lancets misc      AMB POC GLUCOSE BLOOD, BY GLUCOSE MONITORING DEVICE      dulaglutide (Trulicity) 3 JS/7.3 mL pnij        Diagnoses and all orders for this visit:    1. Benign hypertension  Assessment & Plan:   borderline controlled, increase hctz    I advised reduction of dietary salt intake, DASH diet, take medications as prescribed and exercise daily. Orders:  -     hydroCHLOROthiazide (HYDRODIURIL) 25 mg tablet; Take 1 Tablet by mouth daily. Note higher dose    2.  Uncomplicated asthma, unspecified asthma severity, unspecified whether persistent  -     montelukast (SINGULAIR) 10 mg tablet; Take 1 Tablet by mouth daily for 360 days. -     budesonide-formoteroL (SYMBICORT) 160-4.5 mcg/actuation HFAA; Take 2 Puffs by inhalation two (2) times a day. 3. Type 2 diabetes mellitus without complication, without long-term current use of insulin ([facility])  -     Blood-Glucose Meter monitoring kit; Fsbs daily Dx E11.9  -     glucose blood VI test strips strip; Check fsbs  -     lancets misc; daily  -     AMB POC GLUCOSE BLOOD, BY GLUCOSE MONITORING DEVICE  -     dulaglutide (Trulicity) 3 QX/5.6 mL pnij; 3 mg by SubCUTAneous route every seven (7) days. Replaces 1.5 mg dose      Follow-up and Dispositions    Return in about 3 months (around 7/24/2023) for diabetes, pap.       lab results and schedule of future lab studies reviewed with patient  reviewed medications and side effects in detail  specific diabetic recommendations: home glucose monitoring emphasized and glucose meter dispensed to patient      I have discussed the diagnosis with the patient and the intended plan as seen in the above orders. Social history, medical history, and labs were reviewed. The patient has received an after-visit summary and questions were answered concerning future plans. I have discussed medication side effects and warnings with the patient as well. Patient/guardian verbalized understanding and accepts plan & risks. Please note that this dictation was completed with [employer], the computer voice recognition software. Quite often unanticipated grammatical, syntax, homophones, and other interpretive errors are inadvertently transcribed by the computer software. Please disregard these errors. Please excuse any errors that have escaped final proofreading. Thank you.      MD JIMENEZ Miller & RAJENDRA CRUZ [facility]  04/24/23

## 2023-04-24 NOTE — ASSESSMENT & PLAN NOTE
borderline controlled, increase hctz    I advised reduction of dietary salt intake, DASH diet, take medications as prescribed and exercise daily.

## 2023-05-29 RX ORDER — LOSARTAN POTASSIUM 50 MG/1
TABLET ORAL
COMMUNITY
Start: 2022-11-28 | End: 2023-07-24 | Stop reason: SDUPTHER

## 2023-05-29 RX ORDER — ATORVASTATIN CALCIUM 20 MG/1
20 TABLET, FILM COATED ORAL DAILY
COMMUNITY
Start: 2023-01-12 | End: 2023-07-24 | Stop reason: SDUPTHER

## 2023-05-29 RX ORDER — HYDROCHLOROTHIAZIDE 25 MG/1
25 TABLET ORAL DAILY
COMMUNITY
Start: 2023-04-24 | End: 2023-07-24 | Stop reason: SDUPTHER

## 2023-05-29 RX ORDER — LANCETS 30 GAUGE
EACH MISCELLANEOUS
COMMUNITY
Start: 2023-04-24

## 2023-05-29 RX ORDER — DULAGLUTIDE 3 MG/.5ML
3 INJECTION, SOLUTION SUBCUTANEOUS
COMMUNITY
Start: 2023-04-24

## 2023-05-29 RX ORDER — HYDROXYZINE HYDROCHLORIDE 25 MG/1
1 TABLET, FILM COATED ORAL NIGHTLY PRN
COMMUNITY
Start: 2023-01-10 | End: 2023-07-24 | Stop reason: ALTCHOICE

## 2023-05-29 RX ORDER — POLYETHYLENE GLYCOL 3350 17 G/17G
17 POWDER, FOR SOLUTION ORAL EVERY OTHER DAY
COMMUNITY
Start: 2022-07-12 | End: 2023-07-24 | Stop reason: SDUPTHER

## 2023-05-29 RX ORDER — PAROXETINE 10 MG/1
1 TABLET, FILM COATED ORAL DAILY
COMMUNITY
Start: 2023-03-16 | End: 2023-07-24 | Stop reason: ALTCHOICE

## 2023-05-29 RX ORDER — MONTELUKAST SODIUM 10 MG/1
10 TABLET ORAL DAILY
Qty: 30 TABLET | Refills: 11 | COMMUNITY
Start: 2023-04-24 | End: 2024-04-18

## 2023-07-24 ENCOUNTER — OFFICE VISIT (OUTPATIENT)
Facility: CLINIC | Age: 58
End: 2023-07-24
Payer: COMMERCIAL

## 2023-07-24 VITALS
DIASTOLIC BLOOD PRESSURE: 82 MMHG | HEIGHT: 65 IN | HEART RATE: 64 BPM | RESPIRATION RATE: 17 BRPM | WEIGHT: 293 LBS | SYSTOLIC BLOOD PRESSURE: 154 MMHG | BODY MASS INDEX: 48.82 KG/M2 | TEMPERATURE: 97.8 F | OXYGEN SATURATION: 97 %

## 2023-07-24 DIAGNOSIS — F41.9 ANXIETY: ICD-10-CM

## 2023-07-24 DIAGNOSIS — Z79.899 LONG TERM USE OF DRUG: ICD-10-CM

## 2023-07-24 DIAGNOSIS — E78.2 MIXED HYPERLIPIDEMIA: ICD-10-CM

## 2023-07-24 DIAGNOSIS — I10 BENIGN HYPERTENSION: ICD-10-CM

## 2023-07-24 DIAGNOSIS — J45.909 UNCOMPLICATED ASTHMA, UNSPECIFIED ASTHMA SEVERITY, UNSPECIFIED WHETHER PERSISTENT: ICD-10-CM

## 2023-07-24 DIAGNOSIS — E11.9 TYPE 2 DIABETES MELLITUS WITHOUT COMPLICATION, WITHOUT LONG-TERM CURRENT USE OF INSULIN (HCC): Primary | ICD-10-CM

## 2023-07-24 PROBLEM — E55.9 VITAMIN D DEFICIENCY: Status: ACTIVE | Noted: 2023-07-24

## 2023-07-24 PROBLEM — K21.9 ACID REFLUX: Status: ACTIVE | Noted: 2023-07-24

## 2023-07-24 PROBLEM — J30.2 SEASONAL ALLERGIC RHINITIS: Status: ACTIVE | Noted: 2023-07-24

## 2023-07-24 PROBLEM — K64.1 PROLAPSED INTERNAL HEMORRHOIDS, GRADE 2: Status: ACTIVE | Noted: 2023-07-24

## 2023-07-24 PROBLEM — E78.5 HYPERLIPIDEMIA: Status: ACTIVE | Noted: 2023-07-24

## 2023-07-24 PROBLEM — K76.0 FATTY LIVER: Status: ACTIVE | Noted: 2023-07-24

## 2023-07-24 LAB — HBA1C MFR BLD: 6.7 %

## 2023-07-24 PROCEDURE — 3079F DIAST BP 80-89 MM HG: CPT | Performed by: FAMILY MEDICINE

## 2023-07-24 PROCEDURE — 83036 HEMOGLOBIN GLYCOSYLATED A1C: CPT | Performed by: FAMILY MEDICINE

## 2023-07-24 PROCEDURE — 99214 OFFICE O/P EST MOD 30 MIN: CPT | Performed by: FAMILY MEDICINE

## 2023-07-24 PROCEDURE — 3077F SYST BP >= 140 MM HG: CPT | Performed by: FAMILY MEDICINE

## 2023-07-24 RX ORDER — ALBUTEROL SULFATE 90 UG/1
2 AEROSOL, METERED RESPIRATORY (INHALATION) EVERY 4 HOURS PRN
Qty: 1 EACH | Refills: 2 | Status: SHIPPED | OUTPATIENT
Start: 2023-07-24

## 2023-07-24 RX ORDER — LOSARTAN POTASSIUM 100 MG/1
TABLET ORAL
Qty: 90 TABLET | Refills: 1 | Status: SHIPPED | OUTPATIENT
Start: 2023-07-24

## 2023-07-24 RX ORDER — LOSARTAN POTASSIUM 50 MG/1
TABLET ORAL
Qty: 90 TABLET | Refills: 1 | Status: CANCELLED | OUTPATIENT
Start: 2023-07-24

## 2023-07-24 RX ORDER — DULOXETIN HYDROCHLORIDE 30 MG/1
30 CAPSULE, DELAYED RELEASE ORAL DAILY
Qty: 30 CAPSULE | Refills: 5 | Status: SHIPPED | OUTPATIENT
Start: 2023-07-24

## 2023-07-24 RX ORDER — PNEUMOCOCCAL 20-VALENT CONJUGATE VACCINE 2.2; 2.2; 2.2; 2.2; 2.2; 2.2; 2.2; 2.2; 2.2; 2.2; 2.2; 2.2; 2.2; 2.2; 2.2; 2.2; 4.4; 2.2; 2.2; 2.2 UG/.5ML; UG/.5ML; UG/.5ML; UG/.5ML; UG/.5ML; UG/.5ML; UG/.5ML; UG/.5ML; UG/.5ML; UG/.5ML; UG/.5ML; UG/.5ML; UG/.5ML; UG/.5ML; UG/.5ML; UG/.5ML; UG/.5ML; UG/.5ML; UG/.5ML; UG/.5ML
0.5 INJECTION, SUSPENSION INTRAMUSCULAR ONCE
Qty: 1 EACH | Refills: 0 | Status: SHIPPED | OUTPATIENT
Start: 2023-07-24 | End: 2023-07-24

## 2023-07-24 RX ORDER — HYDROCHLOROTHIAZIDE 25 MG/1
25 TABLET ORAL DAILY
Qty: 90 TABLET | Refills: 1 | Status: SHIPPED | OUTPATIENT
Start: 2023-07-24

## 2023-07-24 RX ORDER — ATORVASTATIN CALCIUM 20 MG/1
20 TABLET, FILM COATED ORAL DAILY
Qty: 90 TABLET | Refills: 1 | Status: SHIPPED | OUTPATIENT
Start: 2023-07-24

## 2023-07-24 RX ORDER — ALBUTEROL SULFATE 90 UG/1
2 AEROSOL, METERED RESPIRATORY (INHALATION) EVERY 4 HOURS PRN
COMMUNITY
End: 2023-07-24 | Stop reason: SDUPTHER

## 2023-07-24 RX ORDER — POLYETHYLENE GLYCOL 3350 17 G/17G
17 POWDER, FOR SOLUTION ORAL DAILY
Qty: 1 EACH | Refills: 5 | Status: SHIPPED | OUTPATIENT
Start: 2023-07-24

## 2023-07-24 RX ORDER — BUDESONIDE AND FORMOTEROL FUMARATE DIHYDRATE 160; 4.5 UG/1; UG/1
2 AEROSOL RESPIRATORY (INHALATION) 2 TIMES DAILY
COMMUNITY
Start: 2023-04-24 | End: 2023-07-24 | Stop reason: SDUPTHER

## 2023-07-24 RX ORDER — BUDESONIDE AND FORMOTEROL FUMARATE DIHYDRATE 160; 4.5 UG/1; UG/1
2 AEROSOL RESPIRATORY (INHALATION) 2 TIMES DAILY
Qty: 10.2 G | Refills: 5 | Status: SHIPPED | OUTPATIENT
Start: 2023-07-24

## 2023-07-24 SDOH — ECONOMIC STABILITY: FOOD INSECURITY: WITHIN THE PAST 12 MONTHS, THE FOOD YOU BOUGHT JUST DIDN'T LAST AND YOU DIDN'T HAVE MONEY TO GET MORE.: NEVER TRUE

## 2023-07-24 SDOH — ECONOMIC STABILITY: HOUSING INSECURITY
IN THE LAST 12 MONTHS, WAS THERE A TIME WHEN YOU DID NOT HAVE A STEADY PLACE TO SLEEP OR SLEPT IN A SHELTER (INCLUDING NOW)?: NO

## 2023-07-24 SDOH — ECONOMIC STABILITY: INCOME INSECURITY: HOW HARD IS IT FOR YOU TO PAY FOR THE VERY BASICS LIKE FOOD, HOUSING, MEDICAL CARE, AND HEATING?: NOT HARD AT ALL

## 2023-07-24 SDOH — ECONOMIC STABILITY: FOOD INSECURITY: WITHIN THE PAST 12 MONTHS, YOU WORRIED THAT YOUR FOOD WOULD RUN OUT BEFORE YOU GOT MONEY TO BUY MORE.: NEVER TRUE

## 2023-07-24 ASSESSMENT — ENCOUNTER SYMPTOMS
SHORTNESS OF BREATH: 0
BLOOD IN STOOL: 0
COUGH: 0

## 2023-07-24 NOTE — ASSESSMENT & PLAN NOTE
Uncontrolled, changes made today: the following changes in treatment are made increase losartan, lab results and schedule of future lab studies reviewed with patient, reviewed diet, exercise and weight control, and reviewed medications and side effects in detail

## 2023-07-24 NOTE — PROGRESS NOTES
[facility]  Chief Complaint   Patient presents with    Follow-up Chronic Condition       History of Present Illness:   Sara Frank is a 65 y.o. female       HPI:  Here for follow-up diabetes and hypertension. I increased hctz in 4/23. Added Trulicity to help with diabetes as well as weight loss. Her 10-year cardiovascular risk was 42% so started her on atorvastatin. Also stopped her trazodone and started on Paxil and hydroxyzine. She had GI SE with paxil. She had her eye exam done in [facility] Dr. Morris Willis, colonoscopy with Dr. Brissa Cesar in [facility] and Pap smear last at [facility]. We do not have those results today. Saw GI last week and advised to increase miralax to every day.      Health Maintenance  Health Maintenance Due   Topic Date Due    Pneumococcal 0-64 years Vaccine (1 - PCV) Never done    HIV screen  Never done    Diabetic retinal exam  Never done    Shingles vaccine (1 of 2) Never done    Cervical cancer screen  12/30/2016    COVID-19 Vaccine (3 - Booster for Moderna series) 03/05/2021    Hepatitis B vaccine (2 of 3 - Risk 3-dose series) 02/21/2023       Past Medical, Family, and Social History:     Past Medical History:   Diagnosis Date    Allergic rhinitis     Asthma     Depression     Fatty liver     GERD (gastroesophageal reflux disease)     Hyperlipidemia     Hypertension     Insomnia     trazodone, melatonin    Migraine headache     ALLEN (obstructive sleep apnea)     Type 2 diabetes mellitus without complication, without long-term current use of insulin ([address]) 01/10/2023    Vitamin D deficiency       Past Surgical History:   Procedure Laterality Date    APPENDECTOMY      CHOLECYSTECTOMY      GI      anal fistulectomy    GYN      salpingectomy for tubal ectopic pregnancy, BTL       Current Outpatient Medications on File Prior to Visit   Medication Sig Dispense Refill    Lancets MISC daily      Dulaglutide (TRULICITY) 3 EY/2.2IP SOPN Inject 3 mg into the skin

## 2023-07-24 NOTE — PROGRESS NOTES
Chief Complaint   Patient presents with    Follow-up Chronic Condition     1. \"Have you been to the ER, urgent care clinic since your last visit? Hospitalized since your last visit? \" No    2. \"Have you seen or consulted any other health care providers outside of the [address] [place] [address] since your last visit? \" GI     3. For patients aged 43-73: Has the patient had a colonoscopy / FIT/ Cologuard? Yes      If the patient is female:    4. For patients aged 43-66: Has the patient had a mammogram within the past 2 years? Yes      5. For patients aged 21-65: Has the patient had a pap smear?  No    Health Maintenance Due   Topic Date Due    Pneumococcal 0-64 years Vaccine (1 - PCV) Never done    HIV screen  Never done    Diabetic retinal exam  Never done    Shingles vaccine (1 of 2) Never done    Cervical cancer screen  12/30/2016    COVID-19 Vaccine (3 - Booster for Moderna series) 03/05/2021    Hepatitis B vaccine (2 of 3 - Risk 3-dose series) 02/21/2023

## 2023-07-25 LAB
ALBUMIN SERPL-MCNC: 4.1 G/DL (ref 3.5–5)
ALBUMIN/GLOB SERPL: 1.3 (ref 1.1–2.2)
ALP SERPL-CCNC: 123 U/L (ref 45–117)
ALT SERPL-CCNC: 28 U/L (ref 12–78)
ANION GAP SERPL CALC-SCNC: 7 MMOL/L (ref 5–15)
AST SERPL-CCNC: 17 U/L (ref 15–37)
BASOPHILS # BLD: 0 K/UL (ref 0–0.1)
BASOPHILS NFR BLD: 0 % (ref 0–1)
BILIRUB SERPL-MCNC: 0.7 MG/DL (ref 0.2–1)
BUN SERPL-MCNC: 11 MG/DL (ref 6–20)
BUN/CREAT SERPL: 13 (ref 12–20)
CALCIUM SERPL-MCNC: 9.6 MG/DL (ref 8.5–10.1)
CHLORIDE SERPL-SCNC: 106 MMOL/L (ref 97–108)
CHOLEST SERPL-MCNC: 166 MG/DL
CO2 SERPL-SCNC: 28 MMOL/L (ref 21–32)
CREAT SERPL-MCNC: 0.88 MG/DL (ref 0.55–1.02)
DIFFERENTIAL METHOD BLD: ABNORMAL
EOSINOPHIL # BLD: 0.1 K/UL (ref 0–0.4)
EOSINOPHIL NFR BLD: 1 % (ref 0–7)
ERYTHROCYTE [DISTWIDTH] IN BLOOD BY AUTOMATED COUNT: 15.9 % (ref 11.5–14.5)
GLOBULIN SER CALC-MCNC: 3.2 G/DL (ref 2–4)
GLUCOSE SERPL-MCNC: 92 MG/DL (ref 65–100)
HCT VFR BLD AUTO: 44.5 % (ref 35–47)
HDLC SERPL-MCNC: 57 MG/DL
HDLC SERPL: 2.9 (ref 0–5)
HGB BLD-MCNC: 13.1 G/DL (ref 11.5–16)
IMM GRANULOCYTES # BLD AUTO: 0 K/UL (ref 0–0.04)
IMM GRANULOCYTES NFR BLD AUTO: 0 % (ref 0–0.5)
LDLC SERPL CALC-MCNC: 94 MG/DL (ref 0–100)
LYMPHOCYTES # BLD: 1.6 K/UL (ref 0.8–3.5)
LYMPHOCYTES NFR BLD: 25 % (ref 12–49)
MCH RBC QN AUTO: 25.3 PG (ref 26–34)
MCHC RBC AUTO-ENTMCNC: 29.4 G/DL (ref 30–36.5)
MCV RBC AUTO: 85.9 FL (ref 80–99)
MONOCYTES # BLD: 0.3 K/UL (ref 0–1)
MONOCYTES NFR BLD: 5 % (ref 5–13)
NEUTS SEG # BLD: 4.3 K/UL (ref 1.8–8)
NEUTS SEG NFR BLD: 69 % (ref 32–75)
NRBC # BLD: 0 K/UL (ref 0–0.01)
NRBC BLD-RTO: 0 PER 100 WBC
PLATELET # BLD AUTO: 300 K/UL (ref 150–400)
PMV BLD AUTO: 11.7 FL (ref 8.9–12.9)
POTASSIUM SERPL-SCNC: 4.1 MMOL/L (ref 3.5–5.1)
PROT SERPL-MCNC: 7.3 G/DL (ref 6.4–8.2)
RBC # BLD AUTO: 5.18 M/UL (ref 3.8–5.2)
SODIUM SERPL-SCNC: 141 MMOL/L (ref 136–145)
TRIGL SERPL-MCNC: 75 MG/DL
VLDLC SERPL CALC-MCNC: 15 MG/DL
WBC # BLD AUTO: 6.4 K/UL (ref 3.6–11)

## 2023-08-23 ENCOUNTER — OFFICE VISIT (OUTPATIENT)
Facility: CLINIC | Age: 58
End: 2023-08-23
Payer: COMMERCIAL

## 2023-08-23 ENCOUNTER — HOSPITAL ENCOUNTER (OUTPATIENT)
Facility: HOSPITAL | Age: 58
Setting detail: SPECIMEN
Discharge: HOME | End: 2023-08-26
Payer: COMMERCIAL

## 2023-08-23 VITALS
OXYGEN SATURATION: 96 % | DIASTOLIC BLOOD PRESSURE: 63 MMHG | WEIGHT: 293 LBS | BODY MASS INDEX: 48.82 KG/M2 | HEART RATE: 65 BPM | TEMPERATURE: 97.2 F | SYSTOLIC BLOOD PRESSURE: 128 MMHG | HEIGHT: 65 IN

## 2023-08-23 DIAGNOSIS — Z12.31 SCREENING MAMMOGRAM FOR BREAST CANCER: ICD-10-CM

## 2023-08-23 DIAGNOSIS — Z01.419 ENCOUNTER FOR GYNECOLOGICAL EXAMINATION WITHOUT ABNORMAL FINDING: Primary | ICD-10-CM

## 2023-08-23 DIAGNOSIS — Z12.4 SCREENING FOR MALIGNANT NEOPLASM OF CERVIX: ICD-10-CM

## 2023-08-23 DIAGNOSIS — Z23 ENCOUNTER FOR IMMUNIZATION: ICD-10-CM

## 2023-08-23 PROCEDURE — 87624 HPV HI-RISK TYP POOLED RSLT: CPT

## 2023-08-23 PROCEDURE — 3078F DIAST BP <80 MM HG: CPT | Performed by: FAMILY MEDICINE

## 2023-08-23 PROCEDURE — 99396 PREV VISIT EST AGE 40-64: CPT | Performed by: FAMILY MEDICINE

## 2023-08-23 PROCEDURE — 90471 IMMUNIZATION ADMIN: CPT | Performed by: FAMILY MEDICINE

## 2023-08-23 PROCEDURE — 3074F SYST BP LT 130 MM HG: CPT | Performed by: FAMILY MEDICINE

## 2023-08-23 PROCEDURE — 88175 CYTOPATH C/V AUTO FLUID REDO: CPT

## 2023-08-23 PROCEDURE — 90746 HEPB VACCINE 3 DOSE ADULT IM: CPT | Performed by: FAMILY MEDICINE

## 2023-08-23 ASSESSMENT — ENCOUNTER SYMPTOMS
BLOOD IN STOOL: 0
SHORTNESS OF BREATH: 0
COUGH: 0

## 2023-08-23 NOTE — PROGRESS NOTES
Chief Complaint   Patient presents with    Gynecologic Exam     PAP      HPI:  Den Santana is a 63 y.o. female presenting for well woman exam.   No breast masses, nipple discharge. denies blood in stool, urine, no vaginal bleeding. LMP postmenopausal    Last pap 2013  Health Maintenance Due   Topic Date Due    Pneumococcal 0-64 years Vaccine (1 - PCV) Never done    HIV screen  Never done    Diabetic retinal exam  Never done    Shingles vaccine (1 of 2) Never done    Cervical cancer screen  12/30/2016    COVID-19 Vaccine (3 - Booster for Moderna series) 03/05/2021    Flu vaccine (1) Never done        Allergies- reviewed: Allergies   Allergen Reactions    Diphenhydramine Other (See Comments)     Other reaction(s): Hyperactive    Lisinopril Cough    Midodrine Hives    Escitalopram Other (See Comments) and Palpitations    Naltrexone-Bupropion Hcl Er Palpitations    Sumatriptan Palpitations         Medications- reviewed:   Current Outpatient Medications   Medication Sig    albuterol sulfate HFA (PROVENTIL;VENTOLIN;PROAIR) 108 (90 Base) MCG/ACT inhaler Inhale 2 puffs into the lungs every 4 hours as needed for Wheezing or Shortness of Breath    atorvastatin (LIPITOR) 20 MG tablet Take 1 tablet by mouth daily    budesonide-formoterol (SYMBICORT) 160-4.5 MCG/ACT AERO Inhale 2 puffs into the lungs 2 times daily    hydroCHLOROthiazide (HYDRODIURIL) 25 MG tablet Take 1 tablet by mouth daily    polyethylene glycol (GLYCOLAX) 17 GM/SCOOP powder Take 17 g by mouth daily    losartan (COZAAR) 100 MG tablet TAKE ONE TABLET BY MOUTH EVERY DAY FOR BLOOD PRESSURE control FOR 90 DAYS    DULoxetine (CYMBALTA) 30 MG extended release capsule Take 1 capsule by mouth daily    Lancets MISC daily    Dulaglutide (TRULICITY) 3 MO/9.6UR SOPN Inject 3 mg into the skin every 7 days    montelukast (SINGULAIR) 10 MG tablet Take 1 tablet by mouth daily     No current facility-administered medications for this visit.          Past Medical

## 2023-08-23 NOTE — PROGRESS NOTES
1. \"Have you been to the ER, urgent care clinic since your last visit? Hospitalized since your last visit? \" NO    2. \"Have you seen or consulted any other health care providers outside of the [address] [place] [address] since your last visit? \" no    3. For patients aged 43-73: Has the patient had a colonoscopy / FIT/ Cologuard? YES      If the patient is female:    4. For patients aged 43-66: Has the patient had a mammogram within the past 2 years? YES      5. For patients aged 21-65: Has the patient had a pap smear?  NO    Health Maintenance Due   Topic Date Due    Pneumococcal 0-64 years Vaccine (1 - PCV) Never done    HIV screen  Never done    Diabetic retinal exam  Never done    Shingles vaccine (1 of 2) Never done    Cervical cancer screen  12/30/2016    COVID-19 Vaccine (3 - Booster for Moderna series) 03/05/2021    Hepatitis B vaccine (2 of 3 - Risk 3-dose series) 02/21/2023    Flu vaccine (1) Never done

## 2024-01-24 ENCOUNTER — OFFICE VISIT (OUTPATIENT)
Facility: CLINIC | Age: 59
End: 2024-01-24
Payer: COMMERCIAL

## 2024-01-24 VITALS
TEMPERATURE: 97 F | WEIGHT: 293 LBS | OXYGEN SATURATION: 97 % | HEIGHT: 65 IN | RESPIRATION RATE: 18 BRPM | HEART RATE: 68 BPM | BODY MASS INDEX: 48.82 KG/M2 | SYSTOLIC BLOOD PRESSURE: 142 MMHG | DIASTOLIC BLOOD PRESSURE: 73 MMHG

## 2024-01-24 DIAGNOSIS — F33.1 MODERATE EPISODE OF RECURRENT MAJOR DEPRESSIVE DISORDER (HCC): ICD-10-CM

## 2024-01-24 DIAGNOSIS — Z79.899 LONG TERM USE OF DRUG: ICD-10-CM

## 2024-01-24 DIAGNOSIS — E11.9 TYPE 2 DIABETES MELLITUS WITHOUT COMPLICATION, WITHOUT LONG-TERM CURRENT USE OF INSULIN (HCC): Primary | ICD-10-CM

## 2024-01-24 DIAGNOSIS — E78.2 MIXED HYPERLIPIDEMIA: ICD-10-CM

## 2024-01-24 DIAGNOSIS — I10 BENIGN HYPERTENSION: ICD-10-CM

## 2024-01-24 PROCEDURE — 3077F SYST BP >= 140 MM HG: CPT | Performed by: FAMILY MEDICINE

## 2024-01-24 PROCEDURE — 3078F DIAST BP <80 MM HG: CPT | Performed by: FAMILY MEDICINE

## 2024-01-24 PROCEDURE — 99214 OFFICE O/P EST MOD 30 MIN: CPT | Performed by: FAMILY MEDICINE

## 2024-01-24 RX ORDER — LOSARTAN POTASSIUM 100 MG/1
TABLET ORAL
Qty: 90 TABLET | Refills: 1 | Status: SHIPPED | OUTPATIENT
Start: 2024-01-24

## 2024-01-24 RX ORDER — POLYETHYLENE GLYCOL 3350 17 G/17G
17 POWDER, FOR SOLUTION ORAL DAILY
Qty: 1 EACH | Refills: 5 | Status: SHIPPED | OUTPATIENT
Start: 2024-01-24

## 2024-01-24 RX ORDER — AMLODIPINE BESYLATE 2.5 MG/1
2.5 TABLET ORAL DAILY
Qty: 90 TABLET | Refills: 1 | Status: SHIPPED | OUTPATIENT
Start: 2024-01-24

## 2024-01-24 RX ORDER — ROSUVASTATIN CALCIUM 10 MG/1
10 TABLET, COATED ORAL NIGHTLY
Qty: 90 TABLET | Refills: 1 | Status: SHIPPED | OUTPATIENT
Start: 2024-01-24

## 2024-01-24 RX ORDER — BUPROPION HYDROCHLORIDE 150 MG/1
150 TABLET ORAL EVERY MORNING
Qty: 30 TABLET | Refills: 1 | Status: SHIPPED | OUTPATIENT
Start: 2024-01-24

## 2024-01-24 RX ORDER — HYDROCHLOROTHIAZIDE 25 MG/1
25 TABLET ORAL DAILY
Qty: 90 TABLET | Refills: 1 | Status: SHIPPED | OUTPATIENT
Start: 2024-01-24

## 2024-01-24 ASSESSMENT — ANXIETY QUESTIONNAIRES
5. BEING SO RESTLESS THAT IT IS HARD TO SIT STILL: 0
3. WORRYING TOO MUCH ABOUT DIFFERENT THINGS: 0
7. FEELING AFRAID AS IF SOMETHING AWFUL MIGHT HAPPEN: 0
IF YOU CHECKED OFF ANY PROBLEMS ON THIS QUESTIONNAIRE, HOW DIFFICULT HAVE THESE PROBLEMS MADE IT FOR YOU TO DO YOUR WORK, TAKE CARE OF THINGS AT HOME, OR GET ALONG WITH OTHER PEOPLE: NOT DIFFICULT AT ALL
4. TROUBLE RELAXING: 0
6. BECOMING EASILY ANNOYED OR IRRITABLE: 0
2. NOT BEING ABLE TO STOP OR CONTROL WORRYING: 0
GAD7 TOTAL SCORE: 3
1. FEELING NERVOUS, ANXIOUS, OR ON EDGE: 3

## 2024-01-24 ASSESSMENT — PATIENT HEALTH QUESTIONNAIRE - PHQ9
3. TROUBLE FALLING OR STAYING ASLEEP: 3
6. FEELING BAD ABOUT YOURSELF - OR THAT YOU ARE A FAILURE OR HAVE LET YOURSELF OR YOUR FAMILY DOWN: 2
1. LITTLE INTEREST OR PLEASURE IN DOING THINGS: 0
SUM OF ALL RESPONSES TO PHQ QUESTIONS 1-9: 9
2. FEELING DOWN, DEPRESSED OR HOPELESS: 1
5. POOR APPETITE OR OVEREATING: 0
SUM OF ALL RESPONSES TO PHQ9 QUESTIONS 1 & 2: 1
10. IF YOU CHECKED OFF ANY PROBLEMS, HOW DIFFICULT HAVE THESE PROBLEMS MADE IT FOR YOU TO DO YOUR WORK, TAKE CARE OF THINGS AT HOME, OR GET ALONG WITH OTHER PEOPLE: 0
7. TROUBLE CONCENTRATING ON THINGS, SUCH AS READING THE NEWSPAPER OR WATCHING TELEVISION: 0
4. FEELING TIRED OR HAVING LITTLE ENERGY: 3
SUM OF ALL RESPONSES TO PHQ QUESTIONS 1-9: 9
SUM OF ALL RESPONSES TO PHQ QUESTIONS 1-9: 9
9. THOUGHTS THAT YOU WOULD BE BETTER OFF DEAD, OR OF HURTING YOURSELF: 0
SUM OF ALL RESPONSES TO PHQ QUESTIONS 1-9: 9
8. MOVING OR SPEAKING SO SLOWLY THAT OTHER PEOPLE COULD HAVE NOTICED. OR THE OPPOSITE, BEING SO FIGETY OR RESTLESS THAT YOU HAVE BEEN MOVING AROUND A LOT MORE THAN USUAL: 0

## 2024-01-24 ASSESSMENT — ENCOUNTER SYMPTOMS
CONSTIPATION: 1
COUGH: 0
SHORTNESS OF BREATH: 0

## 2024-01-24 NOTE — PROGRESS NOTES
Chief Complaint   Patient presents with    Follow-up     DM check up       \"Have you been to the ER, urgent care clinic since your last visit?  Hospitalized since your last visit?\"    YES;[facility]    “Have you seen or consulted any other health care providers outside of [facility] since your last visit?”    NO            Health Maintenance Due   Topic Date Due    Pneumococcal 0-64 years Vaccine (1 - PCV) Never done    HIV screen  Never done    Diabetic retinal exam  Never done    Shingles vaccine (1 of 2) Never done    Flu vaccine (1) Never done    COVID-19 Vaccine (4 - 2023-24 season) 09/01/2023    Hepatitis B vaccine (3 of 3 - 19+ 3-dose series) 10/18/2023    Depression Monitoring  01/10/2024    Diabetic foot exam  01/24/2024    Diabetic Alb to Cr ratio (uACR) test  01/24/2024

## 2024-01-24 NOTE — PROGRESS NOTES
[facility]  Chief Complaint   Patient presents with    Follow-up     DM check up       History of Present Illness:   Heike Matthews is a 58 y.o. female       HPI:  Here for follow-up diabetes and hypertension. I increased hctz in 4/23 and then increased losartan. She is tolerating those well. .   Added Trulicity to help with diabetes as well as weight loss. No change to her weight.   Her 10-year cardiovascular risk was 42% so started her on atorvastatin. She c/o cramping on 20 mg dose.      Also stopped her trazodone and started on Paxil and hydroxyzine.  She had GI SE with paxil.   Having diarrhea with cymbalta. Tried venlafexaine but was not effective per her report.  She had palpitations on contrave    Early am wakening.    She had her eye exam done in [place] Dr. Calvillo,  Saw GI and advised to increase miralax to every day.   Hemoglobin A1C, POC   Date Value Ref Range Status   07/24/2023 6.7 % Final     The 10-year ASCVD risk score (Nito NOBLE, et al., 2019) is: 14.7%      Health Maintenance  Health Maintenance Due   Topic Date Due    Pneumococcal 0-64 years Vaccine (1 - PCV) Never done    HIV screen  Never done    Diabetic retinal exam  Never done    Shingles vaccine (1 of 2) Never done    Flu vaccine (1) Never done    COVID-19 Vaccine (4 - 2023-24 season) 09/01/2023    Hepatitis B vaccine (3 of 3 - 19+ 3-dose series) 10/18/2023    Diabetic Alb to Cr ratio (uACR) test  01/24/2024       Past Medical, Family, and Social History:     Past Medical History:   Diagnosis Date    Allergic rhinitis     Asthma     Depression     Fatty liver     GERD (gastroesophageal reflux disease)     Hyperlipidemia     Hypertension     Insomnia     trazodone, melatonin    Migraine headache     ALLEN (obstructive sleep apnea)     Type 2 diabetes mellitus without complication, without long-term current use of insulin (HCC) 01/10/2023    Vitamin D deficiency       Past Surgical History:   Procedure Laterality

## 2024-01-25 LAB
ALBUMIN SERPL-MCNC: 4.1 G/DL (ref 3.5–5)
ALBUMIN/GLOB SERPL: 1.2 (ref 1.1–2.2)
ALP SERPL-CCNC: 114 U/L (ref 45–117)
ALT SERPL-CCNC: 27 U/L (ref 12–78)
ANION GAP SERPL CALC-SCNC: 5 MMOL/L (ref 5–15)
AST SERPL-CCNC: 20 U/L (ref 15–37)
BASOPHILS # BLD: 0 K/UL (ref 0–0.1)
BASOPHILS NFR BLD: 1 % (ref 0–1)
BILIRUB SERPL-MCNC: 1.4 MG/DL (ref 0.2–1)
BUN SERPL-MCNC: 8 MG/DL (ref 6–20)
BUN/CREAT SERPL: 9 (ref 12–20)
CALCIUM SERPL-MCNC: 9.6 MG/DL (ref 8.5–10.1)
CHLORIDE SERPL-SCNC: 105 MMOL/L (ref 97–108)
CO2 SERPL-SCNC: 29 MMOL/L (ref 21–32)
CREAT SERPL-MCNC: 0.88 MG/DL (ref 0.55–1.02)
CREAT UR-MCNC: 84.3 MG/DL
DIFFERENTIAL METHOD BLD: ABNORMAL
EOSINOPHIL # BLD: 0.1 K/UL (ref 0–0.4)
EOSINOPHIL NFR BLD: 1 % (ref 0–7)
ERYTHROCYTE [DISTWIDTH] IN BLOOD BY AUTOMATED COUNT: 15.5 % (ref 11.5–14.5)
EST. AVERAGE GLUCOSE BLD GHB EST-MCNC: 128 MG/DL
GLOBULIN SER CALC-MCNC: 3.3 G/DL (ref 2–4)
GLUCOSE SERPL-MCNC: 97 MG/DL (ref 65–100)
HBA1C MFR BLD: 6.1 % (ref 4–5.6)
HCT VFR BLD AUTO: 44.2 % (ref 35–47)
HGB BLD-MCNC: 13.3 G/DL (ref 11.5–16)
IMM GRANULOCYTES # BLD AUTO: 0 K/UL (ref 0–0.04)
IMM GRANULOCYTES NFR BLD AUTO: 0 % (ref 0–0.5)
LYMPHOCYTES # BLD: 1.6 K/UL (ref 0.8–3.5)
LYMPHOCYTES NFR BLD: 24 % (ref 12–49)
MCH RBC QN AUTO: 25.8 PG (ref 26–34)
MCHC RBC AUTO-ENTMCNC: 30.1 G/DL (ref 30–36.5)
MCV RBC AUTO: 85.8 FL (ref 80–99)
MICROALBUMIN UR-MCNC: 0.65 MG/DL
MICROALBUMIN/CREAT UR-RTO: 8 MG/G (ref 0–30)
MONOCYTES # BLD: 0.5 K/UL (ref 0–1)
MONOCYTES NFR BLD: 7 % (ref 5–13)
NEUTS SEG # BLD: 4.4 K/UL (ref 1.8–8)
NEUTS SEG NFR BLD: 67 % (ref 32–75)
NRBC # BLD: 0 K/UL (ref 0–0.01)
NRBC BLD-RTO: 0 PER 100 WBC
PLATELET # BLD AUTO: 284 K/UL (ref 150–400)
PMV BLD AUTO: 11.9 FL (ref 8.9–12.9)
POTASSIUM SERPL-SCNC: 4 MMOL/L (ref 3.5–5.1)
PROT SERPL-MCNC: 7.4 G/DL (ref 6.4–8.2)
RBC # BLD AUTO: 5.15 M/UL (ref 3.8–5.2)
SODIUM SERPL-SCNC: 139 MMOL/L (ref 136–145)
WBC # BLD AUTO: 6.6 K/UL (ref 3.6–11)

## 2024-02-20 ENCOUNTER — OFFICE VISIT (OUTPATIENT)
Facility: CLINIC | Age: 59
End: 2024-02-20
Payer: COMMERCIAL

## 2024-02-20 VITALS
SYSTOLIC BLOOD PRESSURE: 141 MMHG | DIASTOLIC BLOOD PRESSURE: 80 MMHG | HEART RATE: 68 BPM | OXYGEN SATURATION: 98 % | BODY MASS INDEX: 48.48 KG/M2 | WEIGHT: 291 LBS | TEMPERATURE: 97.9 F | HEIGHT: 65 IN | RESPIRATION RATE: 18 BRPM

## 2024-02-20 DIAGNOSIS — B35.6 TINEA CRURIS: ICD-10-CM

## 2024-02-20 DIAGNOSIS — F33.1 MODERATE EPISODE OF RECURRENT MAJOR DEPRESSIVE DISORDER (HCC): ICD-10-CM

## 2024-02-20 DIAGNOSIS — I10 BENIGN HYPERTENSION: Primary | ICD-10-CM

## 2024-02-20 PROCEDURE — 3077F SYST BP >= 140 MM HG: CPT | Performed by: FAMILY MEDICINE

## 2024-02-20 PROCEDURE — 99214 OFFICE O/P EST MOD 30 MIN: CPT | Performed by: FAMILY MEDICINE

## 2024-02-20 PROCEDURE — 3078F DIAST BP <80 MM HG: CPT | Performed by: FAMILY MEDICINE

## 2024-02-20 RX ORDER — KETOCONAZOLE 20 MG/G
CREAM TOPICAL
Qty: 30 G | Refills: 1 | Status: SHIPPED | OUTPATIENT
Start: 2024-02-20

## 2024-02-20 RX ORDER — BUPROPION HYDROCHLORIDE 300 MG/1
300 TABLET ORAL EVERY MORNING
Qty: 90 TABLET | Refills: 1 | Status: SHIPPED | OUTPATIENT
Start: 2024-02-20

## 2024-02-20 RX ORDER — AMLODIPINE BESYLATE 5 MG/1
5 TABLET ORAL DAILY
Qty: 90 TABLET | Refills: 1 | Status: SHIPPED | OUTPATIENT
Start: 2024-02-20

## 2024-02-20 ASSESSMENT — ENCOUNTER SYMPTOMS
COUGH: 0
SHORTNESS OF BREATH: 0

## 2024-02-20 ASSESSMENT — PATIENT HEALTH QUESTIONNAIRE - PHQ9
7. TROUBLE CONCENTRATING ON THINGS, SUCH AS READING THE NEWSPAPER OR WATCHING TELEVISION: 0
6. FEELING BAD ABOUT YOURSELF - OR THAT YOU ARE A FAILURE OR HAVE LET YOURSELF OR YOUR FAMILY DOWN: 0
3. TROUBLE FALLING OR STAYING ASLEEP: 3
8. MOVING OR SPEAKING SO SLOWLY THAT OTHER PEOPLE COULD HAVE NOTICED. OR THE OPPOSITE, BEING SO FIGETY OR RESTLESS THAT YOU HAVE BEEN MOVING AROUND A LOT MORE THAN USUAL: 0
2. FEELING DOWN, DEPRESSED OR HOPELESS: 0
9. THOUGHTS THAT YOU WOULD BE BETTER OFF DEAD, OR OF HURTING YOURSELF: 0
SUM OF ALL RESPONSES TO PHQ9 QUESTIONS 1 & 2: 0
SUM OF ALL RESPONSES TO PHQ QUESTIONS 1-9: 6
5. POOR APPETITE OR OVEREATING: 0
SUM OF ALL RESPONSES TO PHQ QUESTIONS 1-9: 6
SUM OF ALL RESPONSES TO PHQ QUESTIONS 1-9: 6
4. FEELING TIRED OR HAVING LITTLE ENERGY: 3
SUM OF ALL RESPONSES TO PHQ QUESTIONS 1-9: 6
1. LITTLE INTEREST OR PLEASURE IN DOING THINGS: 0
10. IF YOU CHECKED OFF ANY PROBLEMS, HOW DIFFICULT HAVE THESE PROBLEMS MADE IT FOR YOU TO DO YOUR WORK, TAKE CARE OF THINGS AT HOME, OR GET ALONG WITH OTHER PEOPLE: 0

## 2024-02-20 ASSESSMENT — ANXIETY QUESTIONNAIRES
1. FEELING NERVOUS, ANXIOUS, OR ON EDGE: 0
5. BEING SO RESTLESS THAT IT IS HARD TO SIT STILL: 1
7. FEELING AFRAID AS IF SOMETHING AWFUL MIGHT HAPPEN: 0
4. TROUBLE RELAXING: 2
3. WORRYING TOO MUCH ABOUT DIFFERENT THINGS: 3
2. NOT BEING ABLE TO STOP OR CONTROL WORRYING: 0
IF YOU CHECKED OFF ANY PROBLEMS ON THIS QUESTIONNAIRE, HOW DIFFICULT HAVE THESE PROBLEMS MADE IT FOR YOU TO DO YOUR WORK, TAKE CARE OF THINGS AT HOME, OR GET ALONG WITH OTHER PEOPLE: NOT DIFFICULT AT ALL
6. BECOMING EASILY ANNOYED OR IRRITABLE: 0

## 2024-02-20 NOTE — PROGRESS NOTES
1. \"Have you been to the ER, urgent care clinic since your last visit?  Hospitalized since your last visit?\" NO    2. \"Have you seen or consulted any other health care providers outside of the [facility] since your last visit?\" no      Health Maintenance Due   Topic Date Due    Pneumococcal 0-64 years Vaccine (1 - PCV) Never done    HIV screen  Never done    Diabetic retinal exam  Never done    Shingles vaccine (1 of 2) Never done    Flu vaccine (1) Never done    COVID-19 Vaccine (4 - 2023-24 season) 09/01/2023    Hepatitis B vaccine (3 of 3 - 19+ 3-dose series) 10/18/2023

## 2024-02-20 NOTE — PROGRESS NOTES
[facility]  Chief Complaint   Patient presents with    1 Month Follow-Up     BP and mood        History of Present Illness:   Heike Matthews is a 58 y.o. female       HPI:  Here for follow-up diabetes and hypertension. I increased hctz in 4/23 and then increased losartan. She is tolerating those well. .   Added Trulicity to help with diabetes as well as weight loss. No change to her weight.   Changed from atorvastatin to crestor, started low dose wellbutrin 3 weeks ago.  No improvement seen yet. Taking all meds as directed.  No palpitations or h/o seizures.    Hemoglobin A1C   Date Value Ref Range Status   01/24/2024 6.1 (H) 4.0 - 5.6 % Final     Comment:     (NOTE)  HbA1C Interpretive Ranges  <5.7              Normal  5.7 - 6.4         Consider Prediabetes  >6.5              Consider Diabetes       Hemoglobin A1C, POC   Date Value Ref Range Status   07/24/2023 6.7 % Final         Health Maintenance  Health Maintenance Due   Topic Date Due    Pneumococcal 0-64 years Vaccine (1 - PCV) Never done    HIV screen  Never done    Diabetic retinal exam  Never done    Shingles vaccine (1 of 2) Never done    Flu vaccine (1) Never done    COVID-19 Vaccine (4 - 2023-24 season) 09/01/2023    Hepatitis B vaccine (3 of 3 - 19+ 3-dose series) 10/18/2023       Past Medical, Family, and Social History:     Past Medical History:   Diagnosis Date    Allergic rhinitis     Asthma     Depression     Fatty liver     GERD (gastroesophageal reflux disease)     Hyperlipidemia     Hypertension     Insomnia     trazodone, melatonin    Migraine headache     ALLEN (obstructive sleep apnea)     Type 2 diabetes mellitus without complication, without long-term current use of insulin (HCC) 01/10/2023    Vitamin D deficiency       Past Surgical History:   Procedure Laterality Date    APPENDECTOMY      CHOLECYSTECTOMY      GI      anal fistulectomy    GYN      salpingectomy for tubal ectopic pregnancy, BTL       Current

## 2024-02-26 ENCOUNTER — TELEPHONE (OUTPATIENT)
Facility: CLINIC | Age: 59
End: 2024-02-26

## 2024-02-26 NOTE — TELEPHONE ENCOUNTER
Patient reports syncope episode Friday 2/23. Expressed she is unsure if related to medication change. She went to [facility] urgent [facility] for pain related to fall. Reports no work up for syncope.   Appt scheduled. ED precautions given.

## 2024-02-26 NOTE — TELEPHONE ENCOUNTER
Pt called requesting nurse Marielle to give her a call, pt stated she has some questions regarding her medications.    Please advise..

## 2024-02-28 ENCOUNTER — OFFICE VISIT (OUTPATIENT)
Facility: CLINIC | Age: 59
End: 2024-02-28
Payer: COMMERCIAL

## 2024-02-28 VITALS
DIASTOLIC BLOOD PRESSURE: 69 MMHG | SYSTOLIC BLOOD PRESSURE: 126 MMHG | WEIGHT: 287.5 LBS | HEART RATE: 100 BPM | BODY MASS INDEX: 47.9 KG/M2 | HEIGHT: 65 IN | OXYGEN SATURATION: 96 % | RESPIRATION RATE: 18 BRPM | TEMPERATURE: 97.3 F

## 2024-02-28 DIAGNOSIS — R00.0 TACHYCARDIA: ICD-10-CM

## 2024-02-28 DIAGNOSIS — M25.512 ACUTE PAIN OF LEFT SHOULDER: ICD-10-CM

## 2024-02-28 DIAGNOSIS — R55 SYNCOPE, UNSPECIFIED SYNCOPE TYPE: Primary | ICD-10-CM

## 2024-02-28 PROCEDURE — 3078F DIAST BP <80 MM HG: CPT | Performed by: FAMILY MEDICINE

## 2024-02-28 PROCEDURE — 93000 ELECTROCARDIOGRAM COMPLETE: CPT | Performed by: FAMILY MEDICINE

## 2024-02-28 PROCEDURE — 99214 OFFICE O/P EST MOD 30 MIN: CPT | Performed by: FAMILY MEDICINE

## 2024-02-28 PROCEDURE — 3074F SYST BP LT 130 MM HG: CPT | Performed by: FAMILY MEDICINE

## 2024-02-28 RX ORDER — TIZANIDINE 4 MG/1
4 TABLET ORAL
Qty: 10 TABLET | Refills: 0 | Status: SHIPPED | OUTPATIENT
Start: 2024-02-28

## 2024-02-28 ASSESSMENT — ENCOUNTER SYMPTOMS
COUGH: 0
SHORTNESS OF BREATH: 0

## 2024-02-28 NOTE — PROGRESS NOTES
No chief complaint on file.      \"Have you been to the ER, urgent care clinic since your last visit?  Hospitalized since your last visit?\"    YES; Urgent care in [place]    “Have you seen or consulted any other health care providers outside of [facility] since your last visit?”    NO              Health Maintenance Due   Topic Date Due    Pneumococcal 0-64 years Vaccine (1 - PCV) Never done    HIV screen  Never done    Diabetic retinal exam  Never done    Shingles vaccine (1 of 2) Never done    Flu vaccine (1) Never done    COVID-19 Vaccine (4 - 2023-24 season) 09/01/2023    Hepatitis B vaccine (3 of 3 - 19+ 3-dose series) 10/18/2023        
Take 1 tablet by mouth every morning 90 tablet 1    amLODIPine (NORVASC) 5 MG tablet Take 1 tablet by mouth daily 90 tablet 1    losartan (COZAAR) 100 MG tablet TAKE ONE TABLET BY MOUTH EVERY DAY FOR BLOOD PRESSURE control FOR 90 DAYS 90 tablet 1    hydroCHLOROthiazide (HYDRODIURIL) 25 MG tablet Take 1 tablet by mouth daily 90 tablet 1    polyethylene glycol (GLYCOLAX) 17 GM/SCOOP powder Take 17 g by mouth daily 1 each 5    rosuvastatin (CRESTOR) 10 MG tablet Take 1 tablet by mouth nightly 90 tablet 1    TRULICITY 3 MG/0.5ML SOPN INJECT 3 MG  SUBCUTANEOUSLY ONCE A WEEK 4 mL 3    albuterol sulfate HFA (PROVENTIL;VENTOLIN;PROAIR) 108 (90 Base) MCG/ACT inhaler Inhale 2 puffs into the lungs every 4 hours as needed for Wheezing or Shortness of Breath 1 each 2    budesonide-formoterol (SYMBICORT) 160-4.5 MCG/ACT AERO Inhale 2 puffs into the lungs 2 times daily 10.2 g 5    Lancets MISC daily      montelukast (SINGULAIR) 10 MG tablet Take 1 tablet by mouth daily 30 tablet 11     No current facility-administered medications on file prior to visit.       Patient Active Problem List   Diagnosis    Depression    Migraine headache    Asthma    Obstructive sleep apnea (adult) (pediatric)    Type 2 diabetes mellitus without complication, without long-term current use of insulin ([facility])    Benign hypertension    Insomnia    Class 3 severe obesity with serious comorbidity in adult ([facility])    Anxiety    Acid reflux    Fatty liver    Hyperlipidemia    Prolapsed internal hemorrhoids, grade 2    Seasonal allergic rhinitis    Vitamin D deficiency       Social History     Socioeconomic History    Marital status:      Spouse name: None    Number of children: None    Years of education: None    Highest education level: None   Tobacco Use    Smoking status: Never    Smokeless tobacco: Never   Substance and Sexual Activity    Alcohol use: No    Drug use: Not Currently     Social Determinants of Health     Financial Resource Strain: Low

## 2024-03-04 RX ORDER — DULAGLUTIDE 3 MG/.5ML
INJECTION, SOLUTION SUBCUTANEOUS
Qty: 4 ML | Refills: 5 | Status: SHIPPED | OUTPATIENT
Start: 2024-03-04

## 2024-03-05 DIAGNOSIS — M25.512 ACUTE PAIN OF LEFT SHOULDER: Primary | ICD-10-CM

## 2024-03-18 ENCOUNTER — TELEPHONE (OUTPATIENT)
Facility: CLINIC | Age: 59
End: 2024-03-18

## 2024-03-18 NOTE — TELEPHONE ENCOUNTER
Pt states her strips on the heart monitor will not stick. Pt says her last day to wear it is tomorrow. Please advsise  Also pt states she is going to [facility] in [place]. She says they are asking for another referral to be sent to them. Please advise

## 2024-03-26 ENCOUNTER — TELEPHONE (OUTPATIENT)
Facility: CLINIC | Age: 59
End: 2024-03-26

## 2024-03-26 DIAGNOSIS — M25.512 ACUTE PAIN OF LEFT SHOULDER: ICD-10-CM

## 2024-03-26 RX ORDER — TIZANIDINE 4 MG/1
4 TABLET ORAL
Qty: 10 TABLET | Refills: 0 | Status: SHIPPED | OUTPATIENT
Start: 2024-03-26

## 2024-03-26 NOTE — TELEPHONE ENCOUNTER
Pt is still dealing with a lot pain and spasms in her arm. Pt would like to know if Dr can call in an Rx for pain and muscle relaxer. Please send to [facility]. Please advise.

## 2024-03-26 NOTE — TELEPHONE ENCOUNTER
Attempted to call. No answer. Message left.   Would like to advise patient heart monitor report reviewed by Dr. Brown. Normal with a few extra beats.

## 2024-04-16 ENCOUNTER — OFFICE VISIT (OUTPATIENT)
Facility: CLINIC | Age: 59
End: 2024-04-16
Payer: COMMERCIAL

## 2024-04-16 VITALS
DIASTOLIC BLOOD PRESSURE: 81 MMHG | SYSTOLIC BLOOD PRESSURE: 151 MMHG | RESPIRATION RATE: 14 BRPM | HEIGHT: 65 IN | HEART RATE: 66 BPM | TEMPERATURE: 97.8 F | BODY MASS INDEX: 48.35 KG/M2 | OXYGEN SATURATION: 96 % | WEIGHT: 290.2 LBS

## 2024-04-16 DIAGNOSIS — M25.512 ACUTE PAIN OF LEFT SHOULDER: ICD-10-CM

## 2024-04-16 DIAGNOSIS — I10 BENIGN HYPERTENSION: Primary | ICD-10-CM

## 2024-04-16 PROCEDURE — 3077F SYST BP >= 140 MM HG: CPT | Performed by: FAMILY MEDICINE

## 2024-04-16 PROCEDURE — 99213 OFFICE O/P EST LOW 20 MIN: CPT | Performed by: FAMILY MEDICINE

## 2024-04-16 PROCEDURE — 3078F DIAST BP <80 MM HG: CPT | Performed by: FAMILY MEDICINE

## 2024-04-16 NOTE — PROGRESS NOTES
Chief Complaint   Patient presents with    Follow-up     Blood pressure follow up, saw ortho and physical therapy          \"Have you been to the ER, urgent care clinic since your last visit?  Hospitalized since your last visit?\"    NO    “Have you seen or consulted any other health care providers outside of [facility] since your last visit?”    [facility]            Health Maintenance Due   Topic Date Due    Pneumococcal 0-64 years Vaccine (1 of 2 - PCV) Never done    HIV screen  Never done    Diabetic retinal exam  Never done    Shingles vaccine (1 of 2) Never done    COVID-19 Vaccine (4 - 2023-24 season) 09/01/2023    Hepatitis B vaccine (3 of 3 - 19+ 3-dose series) 10/18/2023       
paperwork as they recommended she stay out of work until after MRI.   Justification for level of billing, time spent: 22 min with patient, reviewing chart and face to face exam, clinical documentation. Time prior to the visit was spent reviewing external notes results and imaging reports.  As well during the visit, time included evaluating the patient, discussing results and plans with the patient, and coordinating care.  As well, after the visit additional time spent documenting clinical care, interpreting results, and coordinating care.  This time was all spent during the date of service.    Return in about 29 days (around 5/15/2024) for htn, DM.   I have discussed the diagnosis with the patient and the intended plan as seen in the above orders.  Social history, medical history, and labs were reviewed.  The patient has received an after-visit summary and questions were answered concerning future plans.  I have discussed medication side effects and warnings with the patient as well. Patient verbalized understanding and accepts plan & risks.      Nguyen Brown MD  [facility]  04/16/24

## 2024-04-25 ENCOUNTER — HOSPITAL ENCOUNTER (OUTPATIENT)
Facility: HOSPITAL | Age: 59
Discharge: HOME | End: 2024-04-25
Attending: ORTHOPAEDIC SURGERY
Payer: COMMERCIAL

## 2024-04-25 VITALS — WEIGHT: 293 LBS | BODY MASS INDEX: 49.59 KG/M2

## 2024-04-25 DIAGNOSIS — M19.012 ARTHRITIS OF LEFT ACROMIOCLAVICULAR JOINT: ICD-10-CM

## 2024-04-25 PROCEDURE — 73221 MRI JOINT UPR EXTREM W/O DYE: CPT

## 2024-04-29 ENCOUNTER — TELEPHONE (OUTPATIENT)
Facility: CLINIC | Age: 59
End: 2024-04-29

## 2024-04-29 NOTE — TELEPHONE ENCOUNTER
Pt states she is having sever pain in her arm. Pt states she had an MRI on this arm and is still waiting to hear back about the results. Pt states she was unable to sleep last night from the pain and OTC meds are not helping. Pt would like to know if  can call in an Rx to help with the pain. Please advise.

## 2024-04-29 NOTE — TELEPHONE ENCOUNTER
Patient called, notified to call Dr. Hadfield's office to discuss pain and MRI. Verbalizes understanding.

## 2024-05-15 ENCOUNTER — OFFICE VISIT (OUTPATIENT)
Facility: CLINIC | Age: 59
End: 2024-05-15
Payer: COMMERCIAL

## 2024-05-15 VITALS
OXYGEN SATURATION: 95 % | WEIGHT: 288.4 LBS | TEMPERATURE: 98 F | HEIGHT: 65 IN | BODY MASS INDEX: 48.05 KG/M2 | RESPIRATION RATE: 18 BRPM | HEART RATE: 65 BPM | DIASTOLIC BLOOD PRESSURE: 73 MMHG | SYSTOLIC BLOOD PRESSURE: 135 MMHG

## 2024-05-15 DIAGNOSIS — I10 BENIGN HYPERTENSION: ICD-10-CM

## 2024-05-15 DIAGNOSIS — E11.9 TYPE 2 DIABETES MELLITUS WITHOUT COMPLICATION, WITHOUT LONG-TERM CURRENT USE OF INSULIN (HCC): ICD-10-CM

## 2024-05-15 DIAGNOSIS — I49.9 IRREGULAR HEART RATE: Primary | ICD-10-CM

## 2024-05-15 DIAGNOSIS — I49.9 CARDIAC ARRHYTHMIA, UNSPECIFIED CARDIAC ARRHYTHMIA TYPE: ICD-10-CM

## 2024-05-15 PROCEDURE — 99214 OFFICE O/P EST MOD 30 MIN: CPT | Performed by: FAMILY MEDICINE

## 2024-05-15 PROCEDURE — 3078F DIAST BP <80 MM HG: CPT | Performed by: FAMILY MEDICINE

## 2024-05-15 PROCEDURE — 3044F HG A1C LEVEL LT 7.0%: CPT | Performed by: FAMILY MEDICINE

## 2024-05-15 PROCEDURE — 3075F SYST BP GE 130 - 139MM HG: CPT | Performed by: FAMILY MEDICINE

## 2024-05-15 PROCEDURE — 93000 ELECTROCARDIOGRAM COMPLETE: CPT | Performed by: FAMILY MEDICINE

## 2024-05-15 RX ORDER — CELECOXIB 200 MG/1
200 CAPSULE ORAL DAILY PRN
COMMUNITY
Start: 2024-04-30

## 2024-05-15 ASSESSMENT — ENCOUNTER SYMPTOMS: SHORTNESS OF BREATH: 0

## 2024-05-15 NOTE — PROGRESS NOTES
Chief Complaint   Patient presents with    1 Month Follow-Up     DM check up       \"Have you been to the ER, urgent care clinic since your last visit?  Hospitalized since your last visit?\"    NO    “Have you seen or consulted any other health care providers outside of [facility] since your last visit?”    NO              Health Maintenance Due   Topic Date Due    Pneumococcal 0-64 years Vaccine (1 of 2 - PCV) Never done    HIV screen  Never done    Diabetic retinal exam  Never done    Shingles vaccine (1 of 2) Never done    COVID-19 Vaccine (4 - 2023-24 season) 09/01/2023    Hepatitis B vaccine (3 of 3 - 19+ 3-dose series) 10/18/2023        
0.5MG/DOS, 2 MG/1.5ML SOPN      4. Benign hypertension  I10         1. Irregular heart rate  -     AMB POC EKG ROUTINE  2. Cardiac arrhythmia, unspecified cardiac arrhythmia type  -     [facility] - Annita Helms MD, Cardiology, [place]  3. Type 2 diabetes mellitus without complication, without long-term current use of insulin ([facility])  Assessment & Plan:   the following changes in treatment are made d/c trulicity and change to ozempic if available, and reviewed medications and side effects in detail    Hemoglobin A1C   Date Value Ref Range Status   01/24/2024 6.1 (H) 4.0 - 5.6 % Final     Comment:     (NOTE)  HbA1C Interpretive Ranges  <5.7              Normal  5.7 - 6.4         Consider Prediabetes  >6.5              Consider Diabetes       Hemoglobin A1C, POC   Date Value Ref Range Status   07/24/2023 6.7 % Final       Orders:  -     Semaglutide,0.25 or 0.5MG/DOS, 2 MG/1.5ML SOPN; Inject 0.25 mg into the skin every 7 days Replaces trulicity, Disp-1.5 mL, R-5Normal  4. Benign hypertension  Assessment & Plan:   Well-controlled, continue current medications    She is presently asymptomatic; will refer to cards due to run of V tach.    Justification for level of billing, time spent: 31 min with patient, reviewing chart and face to face exam, clinical documentation. Time prior to the visit was spent reviewing external notes results and imaging reports.  As well during the visit, time included evaluating the patient, discussing results and plans with the patient, and coordinating care.  As well, after the visit additional time spent documenting clinical care, interpreting results, and coordinating care.  This time was all spent during the date of service.    Return in about 10 weeks (around 7/24/2024).   I have discussed the diagnosis with the patient and the intended plan as seen in the above orders.  Social history, medical history, and labs were reviewed.  The patient has received an after-visit summary and questions

## 2024-05-16 NOTE — ASSESSMENT & PLAN NOTE
the following changes in treatment are made d/c trulicity and change to ozempic if available, and reviewed medications and side effects in detail    Hemoglobin A1C   Date Value Ref Range Status   01/24/2024 6.1 (H) 4.0 - 5.6 % Final     Comment:     (NOTE)  HbA1C Interpretive Ranges  <5.7              Normal  5.7 - 6.4         Consider Prediabetes  >6.5              Consider Diabetes       Hemoglobin A1C, POC   Date Value Ref Range Status   07/24/2023 6.7 % Final

## 2024-05-29 ENCOUNTER — TELEPHONE (OUTPATIENT)
Facility: CLINIC | Age: 59
End: 2024-05-29

## 2024-05-29 DIAGNOSIS — E11.9 TYPE 2 DIABETES MELLITUS WITHOUT COMPLICATION, WITHOUT LONG-TERM CURRENT USE OF INSULIN (HCC): Primary | ICD-10-CM

## 2024-05-29 NOTE — TELEPHONE ENCOUNTER
Patient called, notified to stay on trulicity. Verbalizes understanding. [facility] is out of stock on trulicity. Notified to call us if she is unable to take injection on next scheduled date.

## 2024-06-05 ENCOUNTER — TELEPHONE (OUTPATIENT)
Facility: CLINIC | Age: 59
End: 2024-06-05

## 2024-06-05 DIAGNOSIS — E11.9 TYPE 2 DIABETES MELLITUS WITHOUT COMPLICATION, WITHOUT LONG-TERM CURRENT USE OF INSULIN (HCC): Primary | ICD-10-CM

## 2024-06-05 NOTE — TELEPHONE ENCOUNTER
Spoke to patient. Advised her that I have checked with [facility] and [facility] and neither have in stock. She will check other pharmacy's and let us know if she is not able to locate a pharmacy that med is in stock.

## 2024-06-05 NOTE — TELEPHONE ENCOUNTER
Pt states [facility] does not have the Trulicity in stock, it is on back order. Pt missed her dose this past Thursday. Please advise.

## 2024-06-06 NOTE — TELEPHONE ENCOUNTER
Pt states she called every pharmacy that is somewhat local and no one has her medication in stock. Pt states she has not had her injection in a while. Please advise.

## 2024-06-11 ENCOUNTER — TELEPHONE (OUTPATIENT)
Facility: CLINIC | Age: 59
End: 2024-06-11

## 2024-06-11 NOTE — TELEPHONE ENCOUNTER
Liraglutide (VICTOZA) 18 MG/3ML SOPN SC injection   Pt states this Rx is on back order, no local pharmacies have it in stock. Please advise.

## 2024-06-11 NOTE — TELEPHONE ENCOUNTER
Meds not necessary at this time.   Hemoglobin A1C   Date Value Ref Range Status   01/24/2024 6.1 (H) 4.0 - 5.6 % Final     Comment:     (NOTE)  HbA1C Interpretive Ranges  <5.7              Normal  5.7 - 6.4         Consider Prediabetes  >6.5              Consider Diabetes

## 2024-06-12 ENCOUNTER — OFFICE VISIT (OUTPATIENT)
Age: 59
End: 2024-06-12
Payer: COMMERCIAL

## 2024-06-12 VITALS
OXYGEN SATURATION: 97 % | RESPIRATION RATE: 20 BRPM | TEMPERATURE: 98.1 F | WEIGHT: 293 LBS | HEART RATE: 62 BPM | BODY MASS INDEX: 48.82 KG/M2 | HEIGHT: 65 IN | SYSTOLIC BLOOD PRESSURE: 151 MMHG | DIASTOLIC BLOOD PRESSURE: 84 MMHG

## 2024-06-12 DIAGNOSIS — R55 SYNCOPE, UNSPECIFIED SYNCOPE TYPE: Primary | ICD-10-CM

## 2024-06-12 DIAGNOSIS — I10 HYPERTENSION, ESSENTIAL: ICD-10-CM

## 2024-06-12 DIAGNOSIS — E78.00 HYPERCHOLESTEREMIA: ICD-10-CM

## 2024-06-12 DIAGNOSIS — I47.29 NSVT (NONSUSTAINED VENTRICULAR TACHYCARDIA) (HCC): ICD-10-CM

## 2024-06-12 PROCEDURE — 3079F DIAST BP 80-89 MM HG: CPT | Performed by: SPECIALIST

## 2024-06-12 PROCEDURE — 99214 OFFICE O/P EST MOD 30 MIN: CPT | Performed by: SPECIALIST

## 2024-06-12 PROCEDURE — 3077F SYST BP >= 140 MM HG: CPT | Performed by: SPECIALIST

## 2024-06-12 NOTE — PATIENT INSTRUCTIONS
You have been scheduled for an echo at [facility], we will send results on Email Data Source.
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
## 2024-06-12 NOTE — TELEPHONE ENCOUNTER
Patient called, notified per Dr. Brown-    Her last A1c was 6.1 so medication not necessary at this time.     Verbalizes understanding and thanked for information

## 2024-08-06 ENCOUNTER — OFFICE VISIT (OUTPATIENT)
Facility: CLINIC | Age: 59
End: 2024-08-06
Payer: COMMERCIAL

## 2024-08-06 VITALS
TEMPERATURE: 100 F | BODY MASS INDEX: 48.82 KG/M2 | HEIGHT: 65 IN | WEIGHT: 293 LBS | DIASTOLIC BLOOD PRESSURE: 72 MMHG | SYSTOLIC BLOOD PRESSURE: 153 MMHG | HEART RATE: 113 BPM | RESPIRATION RATE: 20 BRPM | OXYGEN SATURATION: 97 %

## 2024-08-06 DIAGNOSIS — I10 BENIGN HYPERTENSION: ICD-10-CM

## 2024-08-06 DIAGNOSIS — F33.1 MODERATE EPISODE OF RECURRENT MAJOR DEPRESSIVE DISORDER (HCC): ICD-10-CM

## 2024-08-06 DIAGNOSIS — J06.9 VIRAL URI: Primary | ICD-10-CM

## 2024-08-06 DIAGNOSIS — J45.21 MILD INTERMITTENT ASTHMA WITH ACUTE EXACERBATION: ICD-10-CM

## 2024-08-06 LAB
INFLUENZA A ANTIGEN, POC: NEGATIVE
INFLUENZA B ANTIGEN, POC: NEGATIVE
VALID INTERNAL CONTROL, POC: YES

## 2024-08-06 PROCEDURE — 3078F DIAST BP <80 MM HG: CPT

## 2024-08-06 PROCEDURE — 87804 INFLUENZA ASSAY W/OPTIC: CPT

## 2024-08-06 PROCEDURE — 99214 OFFICE O/P EST MOD 30 MIN: CPT

## 2024-08-06 PROCEDURE — 3077F SYST BP >= 140 MM HG: CPT

## 2024-08-06 RX ORDER — BUPROPION HYDROCHLORIDE 300 MG/1
300 TABLET ORAL EVERY MORNING
Qty: 90 TABLET | Refills: 0 | Status: SHIPPED | OUTPATIENT
Start: 2024-08-06

## 2024-08-06 RX ORDER — PREDNISONE 20 MG/1
40 TABLET ORAL DAILY
Qty: 10 TABLET | Refills: 0 | Status: SHIPPED | OUTPATIENT
Start: 2024-08-06 | End: 2024-08-11

## 2024-08-06 RX ORDER — AMLODIPINE BESYLATE 5 MG/1
5 TABLET ORAL DAILY
Qty: 90 TABLET | Refills: 0 | Status: SHIPPED | OUTPATIENT
Start: 2024-08-06

## 2024-08-06 SDOH — ECONOMIC STABILITY: FOOD INSECURITY: WITHIN THE PAST 12 MONTHS, THE FOOD YOU BOUGHT JUST DIDN'T LAST AND YOU DIDN'T HAVE MONEY TO GET MORE.: NEVER TRUE

## 2024-08-06 SDOH — ECONOMIC STABILITY: FOOD INSECURITY: WITHIN THE PAST 12 MONTHS, YOU WORRIED THAT YOUR FOOD WOULD RUN OUT BEFORE YOU GOT MONEY TO BUY MORE.: NEVER TRUE

## 2024-08-06 SDOH — ECONOMIC STABILITY: INCOME INSECURITY: HOW HARD IS IT FOR YOU TO PAY FOR THE VERY BASICS LIKE FOOD, HOUSING, MEDICAL CARE, AND HEATING?: NOT HARD AT ALL

## 2024-08-06 ASSESSMENT — ENCOUNTER SYMPTOMS
NAUSEA: 0
SORE THROAT: 1
VOMITING: 0
SHORTNESS OF BREATH: 0
DIARRHEA: 1
COUGH: 1
WHEEZING: 1

## 2024-08-06 NOTE — PROGRESS NOTES
Reviewed record in preparation for visit and have necessary documentation  Pt did not bring medication to office visit for review  opportunity was given for questions  \"Have you been to the ER, urgent care clinic since your last visit?  Hospitalized since your last visit?\"    NO    “Have you seen or consulted any other health care providers outside of [facility] since your last visit?”    NO      Click Here for Release of Records Request     Goals that were addressed and/or need to be completed during or after this appointment include   Health Maintenance Due   Topic Date Due    Pneumococcal 0-64 years Vaccine (1 of 2 - PCV) Never done    HIV screen  Never done    Diabetic retinal exam  Never done    Shingles vaccine (1 of 2) Never done    COVID-19 Vaccine (4 - 2023-24 season) 09/01/2023    Hepatitis B vaccine (3 of 3 - 19+ 3-dose series) 10/18/2023    Flu vaccine (1) Never done    Lipids  07/24/2024        Pt unable to do strep test due to sensitive gag reflex

## 2024-08-06 NOTE — PROGRESS NOTES
[address], [place] [address]  Tel: [phone]   Subjective   Heike Matthews is a 58 y.o. female who presents for URI (Nasal congestion, sore throat , cough, tmax 101 COVID -, symptoms x 2 days)    Congestion, sore throat, coughing for the past 2 days. Her temperature was up to 101 yesterday along with some chills. She is feeling very fatigued. Denies any body aches. She has been taking tylenol 3x a day with some relief that last for a couple hours. She has noticed some wheezing but is using her inhalers. She had a COVID exposure at work but was wearing an N95, she tested negative for COVID yesterday at home.     Review of Systems   Review of Systems   Constitutional:  Positive for chills, fatigue and fever.   HENT:  Positive for sore throat.    Respiratory:  Positive for cough and wheezing. Negative for shortness of breath.    Gastrointestinal:  Positive for diarrhea. Negative for nausea and vomiting.   Musculoskeletal:  Negative for arthralgias and myalgias.   Neurological:  Negative for dizziness.       Medical History  Past Medical History:   Diagnosis Date    Allergic rhinitis     Asthma     Depression     Fatty liver     GERD (gastroesophageal reflux disease)     Hyperlipidemia     Hypertension     Insomnia     trazodone, melatonin    Migraine headache     ALLEN (obstructive sleep apnea)     Type 2 diabetes mellitus without complication, without long-term current use of insulin ([facility]) 01/10/2023    Vitamin D deficiency        Medications  Current Outpatient Medications   Medication Sig    amLODIPine (NORVASC) 5 MG tablet Take 1 tablet by mouth daily    buPROPion (WELLBUTRIN XL) 300 MG extended release tablet Take 1 tablet by mouth every morning    predniSONE (DELTASONE) 20 MG tablet Take 2 tablets by mouth daily for 5 days    celecoxib (CELEBREX) 200 MG capsule Take 1 capsule by mouth daily as needed for Pain    diclofenac sodium (VOLTAREN) 1 % GEL Apply 2 g topically    ketoconazole

## 2024-08-06 NOTE — PATIENT INSTRUCTIONS
Monitor blood pressure outside the office several times weekly at different times during the day and evening.   Blood Pressure Record     Patient Name:  _____Heike Matthews_________________ :  ________1965________    Date/Time BP Reading Pulse

## 2024-08-07 DIAGNOSIS — I10 BENIGN HYPERTENSION: ICD-10-CM

## 2024-08-07 RX ORDER — HYDROCHLOROTHIAZIDE 25 MG/1
25 TABLET ORAL DAILY
Qty: 90 TABLET | Refills: 0 | Status: SHIPPED | OUTPATIENT
Start: 2024-08-07

## 2024-08-07 RX ORDER — LOSARTAN POTASSIUM 100 MG/1
TABLET ORAL
Qty: 90 TABLET | Refills: 0 | Status: SHIPPED | OUTPATIENT
Start: 2024-08-07

## 2024-08-14 ENCOUNTER — OFFICE VISIT (OUTPATIENT)
Facility: CLINIC | Age: 59
End: 2024-08-14
Payer: COMMERCIAL

## 2024-08-14 VITALS
OXYGEN SATURATION: 95 % | TEMPERATURE: 97.3 F | BODY MASS INDEX: 48.82 KG/M2 | WEIGHT: 293 LBS | SYSTOLIC BLOOD PRESSURE: 132 MMHG | DIASTOLIC BLOOD PRESSURE: 72 MMHG | HEIGHT: 65 IN | RESPIRATION RATE: 14 BRPM | HEART RATE: 74 BPM

## 2024-08-14 DIAGNOSIS — E11.9 TYPE 2 DIABETES MELLITUS WITHOUT COMPLICATION, WITHOUT LONG-TERM CURRENT USE OF INSULIN (HCC): Primary | ICD-10-CM

## 2024-08-14 DIAGNOSIS — E55.9 VITAMIN D DEFICIENCY: ICD-10-CM

## 2024-08-14 DIAGNOSIS — Z79.899 LONG TERM USE OF DRUG: ICD-10-CM

## 2024-08-14 DIAGNOSIS — J45.909 UNCOMPLICATED ASTHMA, UNSPECIFIED ASTHMA SEVERITY, UNSPECIFIED WHETHER PERSISTENT: ICD-10-CM

## 2024-08-14 DIAGNOSIS — E78.2 MIXED HYPERLIPIDEMIA: ICD-10-CM

## 2024-08-14 DIAGNOSIS — F33.1 MODERATE EPISODE OF RECURRENT MAJOR DEPRESSIVE DISORDER (HCC): ICD-10-CM

## 2024-08-14 DIAGNOSIS — I10 BENIGN HYPERTENSION: ICD-10-CM

## 2024-08-14 PROCEDURE — 3044F HG A1C LEVEL LT 7.0%: CPT | Performed by: FAMILY MEDICINE

## 2024-08-14 PROCEDURE — 3075F SYST BP GE 130 - 139MM HG: CPT | Performed by: FAMILY MEDICINE

## 2024-08-14 PROCEDURE — 3078F DIAST BP <80 MM HG: CPT | Performed by: FAMILY MEDICINE

## 2024-08-14 PROCEDURE — 99214 OFFICE O/P EST MOD 30 MIN: CPT | Performed by: FAMILY MEDICINE

## 2024-08-14 RX ORDER — BUPROPION HYDROCHLORIDE 300 MG/1
300 TABLET ORAL EVERY MORNING
Qty: 90 TABLET | Refills: 0 | Status: CANCELLED | OUTPATIENT
Start: 2024-08-14

## 2024-08-14 RX ORDER — ROSUVASTATIN CALCIUM 10 MG/1
10 TABLET, COATED ORAL NIGHTLY
Qty: 90 TABLET | Refills: 1 | Status: SHIPPED | OUTPATIENT
Start: 2024-08-14

## 2024-08-14 RX ORDER — LOSARTAN POTASSIUM 100 MG/1
TABLET ORAL
Qty: 90 TABLET | Refills: 0 | Status: CANCELLED | OUTPATIENT
Start: 2024-08-14

## 2024-08-14 RX ORDER — LIRAGLUTIDE 6 MG/ML
INJECTION SUBCUTANEOUS
COMMUNITY
Start: 2024-08-08

## 2024-08-14 RX ORDER — AMLODIPINE BESYLATE 5 MG/1
5 TABLET ORAL DAILY
Qty: 90 TABLET | Refills: 1 | Status: SHIPPED | OUTPATIENT
Start: 2024-08-14

## 2024-08-14 RX ORDER — BUDESONIDE AND FORMOTEROL FUMARATE DIHYDRATE 160; 4.5 UG/1; UG/1
2 AEROSOL RESPIRATORY (INHALATION) 2 TIMES DAILY
Qty: 10.2 G | Refills: 5 | Status: SHIPPED | OUTPATIENT
Start: 2024-08-14

## 2024-08-14 RX ORDER — LOSARTAN POTASSIUM 100 MG/1
TABLET ORAL
Qty: 90 TABLET | Refills: 1 | Status: SHIPPED | OUTPATIENT
Start: 2024-08-14

## 2024-08-14 RX ORDER — HYDROCHLOROTHIAZIDE 25 MG/1
25 TABLET ORAL DAILY
Qty: 90 TABLET | Refills: 1 | Status: SHIPPED | OUTPATIENT
Start: 2024-08-14

## 2024-08-14 RX ORDER — HYDROCHLOROTHIAZIDE 25 MG/1
25 TABLET ORAL DAILY
Qty: 90 TABLET | Refills: 0 | Status: CANCELLED | OUTPATIENT
Start: 2024-08-14

## 2024-08-14 RX ORDER — ALBUTEROL SULFATE 90 UG/1
2 AEROSOL, METERED RESPIRATORY (INHALATION) EVERY 4 HOURS PRN
Qty: 1 EACH | Refills: 2 | Status: SHIPPED | OUTPATIENT
Start: 2024-08-14

## 2024-08-14 RX ORDER — AMLODIPINE BESYLATE 5 MG/1
5 TABLET ORAL DAILY
Qty: 90 TABLET | Refills: 0 | Status: CANCELLED | OUTPATIENT
Start: 2024-08-14

## 2024-08-14 RX ORDER — BUPROPION HYDROCHLORIDE 300 MG/1
300 TABLET ORAL EVERY MORNING
Qty: 90 TABLET | Refills: 1 | Status: SHIPPED | OUTPATIENT
Start: 2024-08-14

## 2024-08-14 RX ORDER — PNEUMOCOCCAL 20-VALENT CONJUGATE VACCINE 2.2; 2.2; 2.2; 2.2; 2.2; 2.2; 2.2; 2.2; 2.2; 2.2; 2.2; 2.2; 2.2; 2.2; 2.2; 2.2; 4.4; 2.2; 2.2; 2.2 UG/.5ML; UG/.5ML; UG/.5ML; UG/.5ML; UG/.5ML; UG/.5ML; UG/.5ML; UG/.5ML; UG/.5ML; UG/.5ML; UG/.5ML; UG/.5ML; UG/.5ML; UG/.5ML; UG/.5ML; UG/.5ML; UG/.5ML; UG/.5ML; UG/.5ML; UG/.5ML
0.5 INJECTION, SUSPENSION INTRAMUSCULAR ONCE
Qty: 1 EACH | Refills: 0 | Status: SHIPPED | OUTPATIENT
Start: 2024-08-14 | End: 2024-08-14

## 2024-08-14 RX ORDER — ALBUTEROL SULFATE 90 UG/1
2 AEROSOL, METERED RESPIRATORY (INHALATION) EVERY 4 HOURS PRN
Qty: 1 EACH | Refills: 2 | Status: CANCELLED | OUTPATIENT
Start: 2024-08-14

## 2024-08-14 ASSESSMENT — ENCOUNTER SYMPTOMS
SHORTNESS OF BREATH: 0
WHEEZING: 0

## 2024-08-14 NOTE — ASSESSMENT & PLAN NOTE
Well-controlled, continue current medications    Orders:    albuterol sulfate HFA (PROVENTIL;VENTOLIN;PROAIR) 108 (90 Base) MCG/ACT inhaler; Inhale 2 puffs into the lungs every 4 hours as needed for Wheezing or Shortness of Breath    budesonide-formoterol (SYMBICORT) 160-4.5 MCG/ACT AERO; Inhale 2 puffs into the lungs 2 times daily

## 2024-08-14 NOTE — ASSESSMENT & PLAN NOTE
Well-controlled, continue current medications    Orders:    buPROPion (WELLBUTRIN XL) 300 MG extended release tablet; Take 1 tablet by mouth every morning

## 2024-08-14 NOTE — ASSESSMENT & PLAN NOTE
Well-controlled, continue current medications    Orders:    Hemoglobin A1C; Future    Hemoglobin A1C

## 2024-08-14 NOTE — ASSESSMENT & PLAN NOTE
Well-controlled, continue current medications    Orders:    Lipid Panel; Future    Lipid Panel    rosuvastatin (CRESTOR) 10 MG tablet; Take 1 tablet by mouth nightly

## 2024-08-14 NOTE — PROGRESS NOTES
[facility]  Chief Complaint   Patient presents with    3 Month Follow-Up       History of Present Illness:   Heike Matthews is a 58 y.o. female       HPI:  Here for f/u diabetes.  Has not been checking sugars. Just started victoza yesterday.  Ozempic was not covered.  On celebrex 200 mg daily from ortho.      Had syncope episode Friday 2/23/24.   She has not had any recurrence of symptoms. I ordered holter monitor back in 3/24. Initial report normal but company updated software and ran new report which found one episode of V Tach 8 beats on 3/9/24.    Prior to syncopal episode, I had just increased amlodipine and wellbutrin on 2/20. She felt palpitations then but none since.     Had recent URI symptoms - feeling better now after given prednisone  CXR:   1. Mild cardiomegaly  2. There is peribronchial cuffing as described above.    Saw cards, advised echo but she has not done that yet.     Hemoglobin A1C   Date Value Ref Range Status   01/24/2024 6.1 (H) 4.0 - 5.6 % Final     Comment:     (NOTE)  HbA1C Interpretive Ranges  <5.7              Normal  5.7 - 6.4         Consider Prediabetes  >6.5              Consider Diabetes       Needs FMLA forms completed        Health Maintenance  Health Maintenance Due   Topic Date Due    Pneumococcal 0-64 years Vaccine (1 of 2 - PCV) Never done    HIV screen  Never done    Diabetic retinal exam  Never done    Shingles vaccine (1 of 2) Never done    COVID-19 Vaccine (4 - 2023-24 season) 09/01/2023    Hepatitis B vaccine (3 of 3 - 19+ 3-dose series) 10/18/2023    Flu vaccine (1) Never done    Lipids  07/24/2024       Past Medical, Family, and Social History:     Past Medical History:   Diagnosis Date    Allergic rhinitis     Asthma     Depression     Fatty liver     GERD (gastroesophageal reflux disease)     Hyperlipidemia     Hypertension     Insomnia     trazodone, melatonin    Migraine headache     ALLEN (obstructive sleep apnea)     Type 2 diabetes

## 2024-08-14 NOTE — ASSESSMENT & PLAN NOTE
Well-controlled, continue current medications    Orders:    losartan (COZAAR) 100 MG tablet; TAKE ONE TABLET BY MOUTH EVERY DAY FOR BLOOD PRESSURE control FOR 90 DAYS    hydroCHLOROthiazide (HYDRODIURIL) 25 MG tablet; Take 1 tablet by mouth daily    amLODIPine (NORVASC) 5 MG tablet; Take 1 tablet by mouth daily     Room:     Identified pt with two pt identifiers(name and ). Reviewed record in preparation for visit and have obtained necessary documentation. All patient medications has been reviewed. Chief Complaint   Patient presents with    Migraine     Intermittent FMLA     Medication Evaluation     adderall// send link to [phone]          [facility] Maintenance Due   Topic    Cervical cancer screen     Flu Vaccine (1)     Patient-Reported Vitals 9/15/2021   Patient-Reported Weight 142 lb   Patient-Reported LMP 21        4. Have you been to the ER, urgent care clinic since your last visit? Hospitalized since your last visit? No    5. Have you seen or consulted any other health care providers outside of the [address] [place] [address] since your last visit? Include any pap smears or colon screening. No      Patient is accompanied by self I have received verbal consent from Archie to discuss any/all medical information while they are present in the room.

## 2024-08-15 LAB
25(OH)D3 SERPL-MCNC: 16.4 NG/ML (ref 30–100)
ALBUMIN SERPL-MCNC: 3.9 G/DL (ref 3.5–5)
ALBUMIN/GLOB SERPL: 1.2 (ref 1.1–2.2)
ALP SERPL-CCNC: 112 U/L (ref 45–117)
ALT SERPL-CCNC: 38 U/L (ref 12–78)
ANION GAP SERPL CALC-SCNC: 1 MMOL/L (ref 5–15)
AST SERPL-CCNC: 18 U/L (ref 15–37)
BASOPHILS # BLD: 0.1 K/UL (ref 0–0.1)
BASOPHILS NFR BLD: 1 % (ref 0–1)
BILIRUB SERPL-MCNC: 0.8 MG/DL (ref 0.2–1)
BUN SERPL-MCNC: 10 MG/DL (ref 6–20)
BUN/CREAT SERPL: 10 (ref 12–20)
CALCIUM SERPL-MCNC: 9.5 MG/DL (ref 8.5–10.1)
CHLORIDE SERPL-SCNC: 105 MMOL/L (ref 97–108)
CHOLEST SERPL-MCNC: 207 MG/DL
CO2 SERPL-SCNC: 33 MMOL/L (ref 21–32)
CREAT SERPL-MCNC: 1.03 MG/DL (ref 0.55–1.02)
DIFFERENTIAL METHOD BLD: ABNORMAL
EOSINOPHIL # BLD: 0.1 K/UL (ref 0–0.4)
EOSINOPHIL NFR BLD: 1 % (ref 0–7)
ERYTHROCYTE [DISTWIDTH] IN BLOOD BY AUTOMATED COUNT: 15.3 % (ref 11.5–14.5)
EST. AVERAGE GLUCOSE BLD GHB EST-MCNC: 157 MG/DL
GLOBULIN SER CALC-MCNC: 3.2 G/DL (ref 2–4)
GLUCOSE SERPL-MCNC: 128 MG/DL (ref 65–100)
HBA1C MFR BLD: 7.1 % (ref 4–5.6)
HCT VFR BLD AUTO: 42.7 % (ref 35–47)
HDLC SERPL-MCNC: 51 MG/DL
HDLC SERPL: 4.1 (ref 0–5)
HGB BLD-MCNC: 12.8 G/DL (ref 11.5–16)
IMM GRANULOCYTES # BLD AUTO: 0 K/UL (ref 0–0.04)
IMM GRANULOCYTES NFR BLD AUTO: 1 % (ref 0–0.5)
LDLC SERPL CALC-MCNC: 127.2 MG/DL (ref 0–100)
LYMPHOCYTES # BLD: 1.7 K/UL (ref 0.8–3.5)
LYMPHOCYTES NFR BLD: 23 % (ref 12–49)
MCH RBC QN AUTO: 25.8 PG (ref 26–34)
MCHC RBC AUTO-ENTMCNC: 30 G/DL (ref 30–36.5)
MCV RBC AUTO: 86.1 FL (ref 80–99)
MONOCYTES # BLD: 0.3 K/UL (ref 0–1)
MONOCYTES NFR BLD: 4 % (ref 5–13)
NEUTS SEG # BLD: 5.4 K/UL (ref 1.8–8)
NEUTS SEG NFR BLD: 70 % (ref 32–75)
NRBC # BLD: 0 K/UL (ref 0–0.01)
NRBC BLD-RTO: 0 PER 100 WBC
PLATELET # BLD AUTO: 352 K/UL (ref 150–400)
PMV BLD AUTO: 11.4 FL (ref 8.9–12.9)
POTASSIUM SERPL-SCNC: 3.9 MMOL/L (ref 3.5–5.1)
PROT SERPL-MCNC: 7.1 G/DL (ref 6.4–8.2)
RBC # BLD AUTO: 4.96 M/UL (ref 3.8–5.2)
SODIUM SERPL-SCNC: 139 MMOL/L (ref 136–145)
TRIGL SERPL-MCNC: 144 MG/DL
VLDLC SERPL CALC-MCNC: 28.8 MG/DL
WBC # BLD AUTO: 7.7 K/UL (ref 3.6–11)

## 2024-08-16 DIAGNOSIS — E55.9 VITAMIN D DEFICIENCY: Primary | ICD-10-CM

## 2024-08-16 RX ORDER — CHOLECALCIFEROL (VITAMIN D3) 50 MCG
2000 TABLET ORAL DAILY
Qty: 30 TABLET | Refills: 5 | Status: SHIPPED | OUTPATIENT
Start: 2024-08-16

## 2024-10-23 ENCOUNTER — OFFICE VISIT (OUTPATIENT)
Facility: CLINIC | Age: 59
End: 2024-10-23
Payer: COMMERCIAL

## 2024-10-23 VITALS
HEART RATE: 64 BPM | OXYGEN SATURATION: 97 % | SYSTOLIC BLOOD PRESSURE: 168 MMHG | WEIGHT: 293 LBS | TEMPERATURE: 98.6 F | BODY MASS INDEX: 48.82 KG/M2 | HEIGHT: 65 IN | DIASTOLIC BLOOD PRESSURE: 83 MMHG | RESPIRATION RATE: 18 BRPM

## 2024-10-23 DIAGNOSIS — E66.813 CLASS 3 SEVERE OBESITY DUE TO EXCESS CALORIES WITH SERIOUS COMORBIDITY AND BODY MASS INDEX (BMI) OF 50.0 TO 59.9 IN ADULT: ICD-10-CM

## 2024-10-23 DIAGNOSIS — I10 BENIGN HYPERTENSION: ICD-10-CM

## 2024-10-23 DIAGNOSIS — E66.01 CLASS 3 SEVERE OBESITY DUE TO EXCESS CALORIES WITH SERIOUS COMORBIDITY AND BODY MASS INDEX (BMI) OF 50.0 TO 59.9 IN ADULT: ICD-10-CM

## 2024-10-23 DIAGNOSIS — M17.12 PRIMARY OSTEOARTHRITIS OF LEFT KNEE: Primary | ICD-10-CM

## 2024-10-23 DIAGNOSIS — K64.1 PROLAPSED INTERNAL HEMORRHOIDS, GRADE 2: ICD-10-CM

## 2024-10-23 DIAGNOSIS — I87.2 VENOUS INSUFFICIENCY: ICD-10-CM

## 2024-10-23 PROCEDURE — 3078F DIAST BP <80 MM HG: CPT | Performed by: FAMILY MEDICINE

## 2024-10-23 PROCEDURE — 3077F SYST BP >= 140 MM HG: CPT | Performed by: FAMILY MEDICINE

## 2024-10-23 PROCEDURE — 99214 OFFICE O/P EST MOD 30 MIN: CPT | Performed by: FAMILY MEDICINE

## 2024-10-23 RX ORDER — HYDROCORTISONE 25 MG/G
CREAM TOPICAL
Qty: 28 EACH | Refills: 0 | Status: SHIPPED | OUTPATIENT
Start: 2024-10-23

## 2024-10-23 ASSESSMENT — ENCOUNTER SYMPTOMS
SHORTNESS OF BREATH: 0
COUGH: 0
BLOOD IN STOOL: 0

## 2024-10-23 NOTE — PROGRESS NOTES
[facility]  Chief Complaint   Patient presents with    Knee Pain     L. Knee pain X4 days       History of Present Illness:   Heike Matthews is a 59 y.o. female       HPI:  C/o L knee pain x 4 days. Took aleve and helped with pain and swelling x 1 dose.  No falls. Took chairs away at work and she has been standing a lot more since then.   No trauma. C/o bilateral ankle swelling x few weeks.  Tried compression stockings but stopped.    Pain at rectum, no BRBPR. H/o anal fissure, hemorrhoid  Fsbs 120s-140s.     Saw cards in 6/24    Left Ventricle: Normal left ventricular systolic function with a visually estimated EF of 60 - 65%. EF by 2D Simpsons Biplane is 61%. Left ventricle size is normal. Increased wall thickness. Findings consistent with mild concentric hypertrophy. Normal wall motion.    Image quality is technically difficult. Contrast used: Definity.     Lisinopril caused cough    Health Maintenance  Health Maintenance Due   Topic Date Due    Pneumococcal 0-64 years Vaccine (1 of 2 - PCV) Never done    HIV screen  Never done    Diabetic retinal exam  Never done    Shingles vaccine (1 of 2) Never done    Hepatitis B vaccine (3 of 3 - 19+ 3-dose series) 10/18/2023    Flu vaccine (1) Never done    COVID-19 Vaccine (4 - 2023-24 season) 09/01/2024       Past Medical, Family, and Social History:     Past Medical History:   Diagnosis Date    Allergic rhinitis     Asthma     Depression     Fatty liver     GERD (gastroesophageal reflux disease)     Hyperlipidemia     Hypertension     Insomnia     trazodone, melatonin    Migraine headache     ALLEN (obstructive sleep apnea)     Type 2 diabetes mellitus without complication, without long-term current use of insulin ([facility]) 01/10/2023    Vitamin D deficiency       Past Surgical History:   Procedure Laterality Date    APPENDECTOMY      CHOLECYSTECTOMY      GI      anal fistulectomy    GYN      salpingectomy for tubal ectopic pregnancy, BTL

## 2024-10-23 NOTE — ASSESSMENT & PLAN NOTE
Chronic, not at goal, elevated here today due to pain?, previously was 128/76, continue current treatment plan, medication adherence emphasized, and lifestyle modifications recommended, if remains elevated consider increasing amlodipine vs switching to diltiazem due to edema

## 2024-10-23 NOTE — ASSESSMENT & PLAN NOTE
I reviewed diet, exercise and weight control, and reviewed medications and side effects in detail

## 2024-10-23 NOTE — ASSESSMENT & PLAN NOTE
Orders:    hydrocortisone (ANUSOL-HC) 2.5 % CREA rectal cream; Apply topically to affected area x 3 days  Justification for level of billing, time spent: 32 min with patient, reviewing chart and face to face exam, clinical documentation. Time prior to the visit was spent reviewing external notes results and imaging reports.  As well during the visit, time included evaluating the patient, discussing results and plans with the patient, and coordinating care.  As well, after the visit additional time spent documenting clinical care, interpreting results, and coordinating care.  This time was all spent during the date of service.

## 2024-10-23 NOTE — PATIENT INSTRUCTIONS
Advised otc tylenol arthritis 2 tabs every 12 hr, topical diclofenac 2-4 gram on knee every 6 hrs, lidocaine patch 12 hr on and 12 hr off.    Do not take aleve with celebrex. Use sparingly.

## 2024-11-07 ENCOUNTER — OFFICE VISIT (OUTPATIENT)
Facility: CLINIC | Age: 59
End: 2024-11-07
Payer: COMMERCIAL

## 2024-11-07 VITALS
OXYGEN SATURATION: 96 % | SYSTOLIC BLOOD PRESSURE: 133 MMHG | DIASTOLIC BLOOD PRESSURE: 71 MMHG | HEIGHT: 65 IN | WEIGHT: 293 LBS | TEMPERATURE: 97.7 F | HEART RATE: 77 BPM | RESPIRATION RATE: 14 BRPM | BODY MASS INDEX: 48.82 KG/M2

## 2024-11-07 DIAGNOSIS — E78.2 MIXED HYPERLIPIDEMIA: ICD-10-CM

## 2024-11-07 DIAGNOSIS — I10 BENIGN HYPERTENSION: Primary | ICD-10-CM

## 2024-11-07 DIAGNOSIS — M17.12 PRIMARY OSTEOARTHRITIS OF LEFT KNEE: ICD-10-CM

## 2024-11-07 PROCEDURE — 3078F DIAST BP <80 MM HG: CPT | Performed by: FAMILY MEDICINE

## 2024-11-07 PROCEDURE — 99214 OFFICE O/P EST MOD 30 MIN: CPT | Performed by: FAMILY MEDICINE

## 2024-11-07 PROCEDURE — 3075F SYST BP GE 130 - 139MM HG: CPT | Performed by: FAMILY MEDICINE

## 2024-11-07 RX ORDER — ROSUVASTATIN CALCIUM 20 MG/1
20 TABLET, COATED ORAL NIGHTLY
Qty: 90 TABLET | Refills: 1 | Status: SHIPPED | OUTPATIENT
Start: 2024-11-07

## 2024-11-07 RX ORDER — ZOSTER VACCINE RECOMBINANT, ADJUVANTED 50 MCG/0.5
0.5 KIT INTRAMUSCULAR SEE ADMIN INSTRUCTIONS
Qty: 0.5 ML | Refills: 1 | Status: SHIPPED | OUTPATIENT
Start: 2024-11-07 | End: 2025-05-06

## 2024-11-07 RX ORDER — PNEUMOCOCCAL 20-VALENT CONJUGATE VACCINE 2.2; 2.2; 2.2; 2.2; 2.2; 2.2; 2.2; 2.2; 2.2; 2.2; 2.2; 2.2; 2.2; 2.2; 2.2; 2.2; 4.4; 2.2; 2.2; 2.2 UG/.5ML; UG/.5ML; UG/.5ML; UG/.5ML; UG/.5ML; UG/.5ML; UG/.5ML; UG/.5ML; UG/.5ML; UG/.5ML; UG/.5ML; UG/.5ML; UG/.5ML; UG/.5ML; UG/.5ML; UG/.5ML; UG/.5ML; UG/.5ML; UG/.5ML; UG/.5ML
0.5 INJECTION, SUSPENSION INTRAMUSCULAR ONCE
Qty: 1 EACH | Refills: 0 | Status: SHIPPED | OUTPATIENT
Start: 2024-11-07 | End: 2024-11-07

## 2024-11-07 ASSESSMENT — ENCOUNTER SYMPTOMS
COUGH: 0
SHORTNESS OF BREATH: 0

## 2024-11-07 NOTE — ASSESSMENT & PLAN NOTE
Chronic, not at goal (unstable), changes made today: increase crestor  lab results and schedule of future lab studies reviewed with patient, reviewed diet, exercise and weight control, and reviewed medications and side effects in detail  Orders:    rosuvastatin (CRESTOR) 20 MG tablet; Take 1 tablet by mouth nightly

## 2024-11-07 NOTE — PROGRESS NOTES
No chief complaint on file.       \"Have you been to the ER, urgent care clinic since your last visit?  Hospitalized since your last visit?\"    NO    “Have you seen or consulted any other health care providers outside of [facility] since your last visit?”    [place] [facility]             Click Here for Release of Records Request     Health Maintenance Due   Topic Date Due    Pneumococcal 0-64 years Vaccine (1 of 2 - PCV) Never done    HIV screen  Never done    Shingles vaccine (1 of 2) Never done    Hepatitis B vaccine (3 of 3 - 19+ 3-dose series) 10/18/2023    Flu vaccine (1) Never done    COVID-19 Vaccine (4 - 2023-24 season) 09/01/2024       
[id number] at local pharmacy.   Return in about 3 months (around 2/7/2025).   I have discussed the diagnosis with the patient and the intended plan as seen in the above orders.  Social history, medical history, and labs were reviewed.  The patient has received an after-visit summary and questions were answered concerning future plans.  I have discussed medication side effects and warnings with the patient as well. Patient verbalized understanding and accepts plan & risks.        Nguyen Brown MD  [facility]  11/07/24

## 2025-01-15 DIAGNOSIS — E78.2 MIXED HYPERLIPIDEMIA: ICD-10-CM

## 2025-01-16 RX ORDER — ROSUVASTATIN CALCIUM 10 MG/1
10 TABLET, COATED ORAL NIGHTLY
Qty: 90 TABLET | Refills: 1 | OUTPATIENT
Start: 2025-01-16

## 2025-02-17 ENCOUNTER — OFFICE VISIT (OUTPATIENT)
Facility: CLINIC | Age: 60
End: 2025-02-17
Payer: COMMERCIAL

## 2025-02-17 VITALS
RESPIRATION RATE: 18 BRPM | OXYGEN SATURATION: 98 % | HEART RATE: 62 BPM | BODY MASS INDEX: 48.82 KG/M2 | HEIGHT: 65 IN | DIASTOLIC BLOOD PRESSURE: 84 MMHG | WEIGHT: 293 LBS | TEMPERATURE: 97.6 F | SYSTOLIC BLOOD PRESSURE: 192 MMHG

## 2025-02-17 DIAGNOSIS — I10 BENIGN HYPERTENSION: ICD-10-CM

## 2025-02-17 DIAGNOSIS — F33.1 MODERATE EPISODE OF RECURRENT MAJOR DEPRESSIVE DISORDER (HCC): ICD-10-CM

## 2025-02-17 DIAGNOSIS — M79.602 PAIN OF LEFT UPPER EXTREMITY: Primary | ICD-10-CM

## 2025-02-17 DIAGNOSIS — I47.29 NSVT (NONSUSTAINED VENTRICULAR TACHYCARDIA) (HCC): ICD-10-CM

## 2025-02-17 DIAGNOSIS — E11.9 TYPE 2 DIABETES MELLITUS WITHOUT COMPLICATION, WITHOUT LONG-TERM CURRENT USE OF INSULIN (HCC): ICD-10-CM

## 2025-02-17 DIAGNOSIS — E66.01 MORBID (SEVERE) OBESITY DUE TO EXCESS CALORIES: ICD-10-CM

## 2025-02-17 PROCEDURE — 99214 OFFICE O/P EST MOD 30 MIN: CPT | Performed by: FAMILY MEDICINE

## 2025-02-17 PROCEDURE — 3078F DIAST BP <80 MM HG: CPT | Performed by: FAMILY MEDICINE

## 2025-02-17 PROCEDURE — 3077F SYST BP >= 140 MM HG: CPT | Performed by: FAMILY MEDICINE

## 2025-02-17 RX ORDER — AMLODIPINE BESYLATE 10 MG/1
10 TABLET ORAL DAILY
Qty: 90 TABLET | Refills: 0 | Status: SHIPPED | OUTPATIENT
Start: 2025-02-17

## 2025-02-17 RX ORDER — GABAPENTIN 300 MG/1
300 CAPSULE ORAL 2 TIMES DAILY
Qty: 60 CAPSULE | Refills: 0 | Status: SHIPPED | OUTPATIENT
Start: 2025-02-17 | End: 2025-03-19

## 2025-02-17 SDOH — ECONOMIC STABILITY: FOOD INSECURITY: WITHIN THE PAST 12 MONTHS, THE FOOD YOU BOUGHT JUST DIDN'T LAST AND YOU DIDN'T HAVE MONEY TO GET MORE.: NEVER TRUE

## 2025-02-17 SDOH — ECONOMIC STABILITY: FOOD INSECURITY: WITHIN THE PAST 12 MONTHS, YOU WORRIED THAT YOUR FOOD WOULD RUN OUT BEFORE YOU GOT MONEY TO BUY MORE.: NEVER TRUE

## 2025-02-17 ASSESSMENT — PATIENT HEALTH QUESTIONNAIRE - PHQ9
SUM OF ALL RESPONSES TO PHQ QUESTIONS 1-9: 6
SUM OF ALL RESPONSES TO PHQ9 QUESTIONS 1 & 2: 0
1. LITTLE INTEREST OR PLEASURE IN DOING THINGS: NOT AT ALL
6. FEELING BAD ABOUT YOURSELF - OR THAT YOU ARE A FAILURE OR HAVE LET YOURSELF OR YOUR FAMILY DOWN: NOT AT ALL
8. MOVING OR SPEAKING SO SLOWLY THAT OTHER PEOPLE COULD HAVE NOTICED. OR THE OPPOSITE, BEING SO FIGETY OR RESTLESS THAT YOU HAVE BEEN MOVING AROUND A LOT MORE THAN USUAL: NOT AT ALL
9. THOUGHTS THAT YOU WOULD BE BETTER OFF DEAD, OR OF HURTING YOURSELF: NOT AT ALL
SUM OF ALL RESPONSES TO PHQ QUESTIONS 1-9: 6
3. TROUBLE FALLING OR STAYING ASLEEP: NEARLY EVERY DAY
4. FEELING TIRED OR HAVING LITTLE ENERGY: NEARLY EVERY DAY
SUM OF ALL RESPONSES TO PHQ QUESTIONS 1-9: 6
SUM OF ALL RESPONSES TO PHQ QUESTIONS 1-9: 6
2. FEELING DOWN, DEPRESSED OR HOPELESS: NOT AT ALL
7. TROUBLE CONCENTRATING ON THINGS, SUCH AS READING THE NEWSPAPER OR WATCHING TELEVISION: NOT AT ALL
10. IF YOU CHECKED OFF ANY PROBLEMS, HOW DIFFICULT HAVE THESE PROBLEMS MADE IT FOR YOU TO DO YOUR WORK, TAKE CARE OF THINGS AT HOME, OR GET ALONG WITH OTHER PEOPLE: NOT DIFFICULT AT ALL
5. POOR APPETITE OR OVEREATING: NOT AT ALL

## 2025-02-17 NOTE — PROGRESS NOTES
[facility]  Chief Complaint   Patient presents with    6 Month Follow-Up       History of Present Illness:   Heike Matthews is a 59 y.o. female       HPI:  Here for neck and elbow pain x 1 week. Intermittent, severe, achy.   Saw ortho Friday and was given injection, ordering MRI and referring to different ortho per patient.    Per note:  left shoulder--She has had treatment including a steroid injection with the most recent being 5/2/24 and providing moderate relief. Her symptoms initially improved but have returned and worsened. She was recently evaluated in the ER and was placed on prednisone with no relief. She reports pain over the anterior shoulder and posterior upper arm. She rates her pain as 10-Worst pain ever on a 0-10 pain scale. She reports pain with certain motions. She complains of pain at night which disrupts her sleep. She denies a new injury. She denies numbness/tingling or radicular pain.       She has had a prior MRI of the elbow in 2020. There was concern of possible hemangioma or vascular malformation in that region.   Xray at ortho  Impression: X-rays reveal normal glenohumeral articulation with severe AC joint arthritis. No acute fracture or dislocation.     Went to ED last week due to pain. Bp high in ED. Had EKG there.   Given prednisone and muscle relaxer. Says it did not help.   Has been out of work since 2/11/25. Says pain is posterior, below triceps  Hemoglobin A1C   Date Value Ref Range Status   08/14/2024 7.1 (H) 4.0 - 5.6 % Final     Comment:     (NOTE)  HbA1C Interpretive Ranges  <5.7              Normal  5.7 - 6.4         Consider Prediabetes  >6.5              Consider Diabetes       Hemoglobin A1C, POC   Date Value Ref Range Status   07/24/2023 6.7 % Final     Had diarrhea with duloxetine.   Unable to get victoza    Health Maintenance  Health Maintenance Due   Topic Date Due    Pneumococcal 50+ years Vaccine (1 of 2 - PCV) Never done    Shingles vaccine

## 2025-02-17 NOTE — ASSESSMENT & PLAN NOTE
Chronic, not at goal, elevated due to pain and recent steriods , the following changes in treatment are made increase amlodipine, reviewed medications and side effects in detail, and radiology results and schedule of future radiology studies reviewed with patient    Orders:    amLODIPine (NORVASC) 10 MG tablet; Take 1 tablet by mouth daily

## 2025-02-24 ENCOUNTER — HOSPITAL ENCOUNTER (OUTPATIENT)
Facility: HOSPITAL | Age: 60
Discharge: HOME | End: 2025-02-27
Attending: ORTHOPAEDIC SURGERY
Payer: COMMERCIAL

## 2025-02-24 DIAGNOSIS — D18.09 HEMANGIOMA OF OTHER SITES: ICD-10-CM

## 2025-02-24 DIAGNOSIS — M25.522 LEFT ELBOW PAIN: ICD-10-CM

## 2025-02-24 PROCEDURE — 73221 MRI JOINT UPR EXTREM W/O DYE: CPT

## 2025-02-26 ENCOUNTER — OFFICE VISIT (OUTPATIENT)
Facility: CLINIC | Age: 60
End: 2025-02-26
Payer: COMMERCIAL

## 2025-02-26 DIAGNOSIS — M79.602 PAIN OF LEFT UPPER EXTREMITY: Primary | ICD-10-CM

## 2025-02-26 DIAGNOSIS — I10 BENIGN HYPERTENSION: ICD-10-CM

## 2025-02-26 DIAGNOSIS — M67.922 TENDINOPATHY OF ELBOW, LEFT: ICD-10-CM

## 2025-02-26 DIAGNOSIS — Z79.899 LONG TERM USE OF DRUG: ICD-10-CM

## 2025-02-26 DIAGNOSIS — E11.9 TYPE 2 DIABETES MELLITUS WITHOUT COMPLICATION, WITHOUT LONG-TERM CURRENT USE OF INSULIN (HCC): ICD-10-CM

## 2025-02-26 DIAGNOSIS — E55.9 VITAMIN D DEFICIENCY: ICD-10-CM

## 2025-02-26 PROCEDURE — 83036 HEMOGLOBIN GLYCOSYLATED A1C: CPT | Performed by: FAMILY MEDICINE

## 2025-02-26 PROCEDURE — 3077F SYST BP >= 140 MM HG: CPT | Performed by: FAMILY MEDICINE

## 2025-02-26 PROCEDURE — 3078F DIAST BP <80 MM HG: CPT | Performed by: FAMILY MEDICINE

## 2025-02-26 PROCEDURE — 99214 OFFICE O/P EST MOD 30 MIN: CPT | Performed by: FAMILY MEDICINE

## 2025-02-26 PROCEDURE — 3052F HG A1C>EQUAL 8.0%<EQUAL 9.0%: CPT | Performed by: FAMILY MEDICINE

## 2025-02-26 RX ORDER — GABAPENTIN 300 MG/1
300 CAPSULE ORAL 3 TIMES DAILY
Qty: 60 CAPSULE | Refills: 0
Start: 2025-02-26 | End: 2025-03-28

## 2025-02-26 NOTE — ASSESSMENT & PLAN NOTE
Lab Results   Component Value Date/Time    VITD25 16.4 08/14/2024 09:40 AM         Orders:    Vitamin D 25 Hydroxy; Future    Justification for level of billing, time spent: 35 min with patient, reviewing chart and face to face exam, clinical documentation. Time prior to the visit was spent reviewing external notes results and imaging reports.  As well during the visit, time included evaluating the patient, discussing results and plans with the patient, and coordinating care.  As well, after the visit additional time spent documenting clinical care, interpreting results, and coordinating care.  This time was all spent during the date of service.

## 2025-02-26 NOTE — ASSESSMENT & PLAN NOTE
Improved on higher dose of amlodipine, monitor       Justification for level of billing, time spent:  33 min with patient, reviewing chart and face to face exam, clinical documentation. Time prior to the visit was spent reviewing external notes results and imaging reports.  As well during the visit, time included evaluating the patient, discussing results and plans with the patient, and coordinating care.  As well, after the visit additional time spent documenting clinical care, interpreting results, and coordinating care.  This time was all spent during the date of service.

## 2025-02-26 NOTE — PROGRESS NOTES
[facility]  Chief Complaint   Patient presents with    Diabetes    Arm Pain       History of Present Illness:   Heike Matthews is a 59 y.o. female       HPI:  Here for neck and elbow pain that is intermittent, severe, achy.   Saw ortho and was given injection, order MRI and referring to different ortho per patient.  MRI shows mild common extensor tendinopathy. Seeing paulson tomorrow.     Per note:  left shoulder--She has had treatment including a steroid injection with the most recent being 5/2/24 and providing moderate relief. Her symptoms initially improved but have returned and worsened. She was recently evaluated in the ER and was placed on prednisone with no relief. She reports pain over the anterior shoulder and posterior upper arm. She rates her pain as 10-Worst pain ever on a 0-10 pain scale. She reports pain with certain motions. She complains of pain at night which disrupts her sleep. She denies a new injury. She denies numbness/tingling or radicular pain.       She has had a prior MRI of the elbow in 2020. There was concern of possible hemangioma or vascular malformation in that region.   Xray at ortho  Impression: X-rays reveal normal glenohumeral articulation with severe AC joint arthritis. No acute fracture or dislocation.     Went to ED prior due to pain. Bp high in ED. Had EKG there.   Given prednisone and muscle relaxer. Says it did not help.   Has been out of work since 2/11/25. Says pain is posterior, below triceps  I saw her last week and started her on gabapentin 300 mg bid. It helps for a few hours and then pain recurs.     She has been unable to get victoza. Ozempic not covered previously.    I increased bb med last week.     Health Maintenance  Health Maintenance Due   Topic Date Due    Pneumococcal 50+ years Vaccine (1 of 2 - PCV) Never done    Shingles vaccine (1 of 2) Never done    Hepatitis B vaccine (3 of 3 - 19+ 3-dose series) 10/18/2023    Flu vaccine (1)

## 2025-02-26 NOTE — ASSESSMENT & PLAN NOTE
Chronic, not at goal (unstable), [facility]  lab results and schedule of future lab studies reviewed with patient, and reviewed medications and side effects in detail  Orders:    AMB POC HEMOGLOBIN A1C    Albumin/Creatinine Ratio, Urine; Future

## 2025-02-27 ENCOUNTER — TELEPHONE (OUTPATIENT)
Facility: CLINIC | Age: 60
End: 2025-02-27

## 2025-02-27 VITALS
DIASTOLIC BLOOD PRESSURE: 70 MMHG | OXYGEN SATURATION: 96 % | BODY MASS INDEX: 48.82 KG/M2 | WEIGHT: 293 LBS | HEIGHT: 65 IN | RESPIRATION RATE: 17 BRPM | SYSTOLIC BLOOD PRESSURE: 140 MMHG | HEART RATE: 77 BPM | TEMPERATURE: 97.4 F

## 2025-02-27 DIAGNOSIS — E11.9 TYPE 2 DIABETES MELLITUS WITHOUT COMPLICATION, WITHOUT LONG-TERM CURRENT USE OF INSULIN (HCC): ICD-10-CM

## 2025-02-27 LAB — HBA1C MFR BLD: 8.4 %

## 2025-02-27 NOTE — PROGRESS NOTES
Chief Complaint   Patient presents with    Diabetes    Arm Pain        \"Have you been to the ER, urgent care clinic since your last visit?  Hospitalized since your last visit?\"    NO    “Have you seen or consulted any other health care providers outside of [facility] since your last visit?”    NO      LATE ENTRY FOR 2/26/25 - COMPUTER/INTERNET DOWN          Click Here for Release of Records Request     Health Maintenance Due   Topic Date Due    Pneumococcal 50+ years Vaccine (1 of 2 - PCV) Never done    Shingles vaccine (1 of 2) Never done    Hepatitis B vaccine (3 of 3 - 19+ 3-dose series) 10/18/2023    Flu vaccine (1) Never done    Diabetic foot exam  01/24/2025    Diabetic Alb to Cr ratio (uACR) test  01/24/2025

## 2025-02-27 NOTE — TELEPHONE ENCOUNTER
Attempted to call. No answer. Message left.   [facility] Status: Approved, Coverage Starts on: 2/27/2025 Coverage Ends on: 2/27/2026

## 2025-02-27 NOTE — TELEPHONE ENCOUNTER
Pt advised of msg. Pt also states she had her cortisone injection in her left elbow done. Pt states it went well, and if this does not help, then it may be higher up and likely due to issues with her neck. Pt states she will keep the pcp updated.

## 2025-02-27 NOTE — TELEPHONE ENCOUNTER
Please send the Trulicity Rx to [facility]. Pt also states it will need a prior authorization done as well.  Please advise.

## 2025-02-28 DIAGNOSIS — Z79.899 LONG TERM USE OF DRUG: ICD-10-CM

## 2025-02-28 DIAGNOSIS — E55.9 VITAMIN D DEFICIENCY: ICD-10-CM

## 2025-02-28 DIAGNOSIS — E11.9 TYPE 2 DIABETES MELLITUS WITHOUT COMPLICATION, WITHOUT LONG-TERM CURRENT USE OF INSULIN (HCC): ICD-10-CM

## 2025-03-01 LAB
25(OH)D3 SERPL-MCNC: 30.8 NG/ML (ref 30–100)
ALBUMIN SERPL-MCNC: 4 G/DL (ref 3.5–5)
ALBUMIN/GLOB SERPL: 1.1 (ref 1.1–2.2)
ALP SERPL-CCNC: 136 U/L (ref 45–117)
ALT SERPL-CCNC: 36 U/L (ref 12–78)
ANION GAP SERPL CALC-SCNC: 9 MMOL/L (ref 2–12)
AST SERPL-CCNC: 15 U/L (ref 15–37)
BASOPHILS # BLD: 0.04 K/UL (ref 0–0.1)
BASOPHILS NFR BLD: 0.3 % (ref 0–1)
BILIRUB SERPL-MCNC: 1 MG/DL (ref 0.2–1)
BUN SERPL-MCNC: 13 MG/DL (ref 6–20)
BUN/CREAT SERPL: 11 (ref 12–20)
CALCIUM SERPL-MCNC: 9.3 MG/DL (ref 8.5–10.1)
CHLORIDE SERPL-SCNC: 100 MMOL/L (ref 97–108)
CO2 SERPL-SCNC: 28 MMOL/L (ref 21–32)
CREAT SERPL-MCNC: 1.17 MG/DL (ref 0.55–1.02)
CREAT UR-MCNC: 275 MG/DL
DIFFERENTIAL METHOD BLD: ABNORMAL
EOSINOPHIL # BLD: 0.01 K/UL (ref 0–0.4)
EOSINOPHIL NFR BLD: 0.1 % (ref 0–7)
ERYTHROCYTE [DISTWIDTH] IN BLOOD BY AUTOMATED COUNT: 15.7 % (ref 11.5–14.5)
GLOBULIN SER CALC-MCNC: 3.8 G/DL (ref 2–4)
GLUCOSE SERPL-MCNC: 145 MG/DL (ref 65–100)
HCT VFR BLD AUTO: 42.5 % (ref 35–47)
HGB BLD-MCNC: 13.4 G/DL (ref 11.5–16)
IMM GRANULOCYTES # BLD AUTO: 0.05 K/UL (ref 0–0.04)
IMM GRANULOCYTES NFR BLD AUTO: 0.4 % (ref 0–0.5)
LYMPHOCYTES # BLD: 1.54 K/UL (ref 0.8–3.5)
LYMPHOCYTES NFR BLD: 12.3 % (ref 12–49)
MCH RBC QN AUTO: 25.5 PG (ref 26–34)
MCHC RBC AUTO-ENTMCNC: 31.5 G/DL (ref 30–36.5)
MCV RBC AUTO: 81 FL (ref 80–99)
MICROALBUMIN UR-MCNC: 2.76 MG/DL
MICROALBUMIN/CREAT UR-RTO: 10 MG/G (ref 0–30)
MONOCYTES # BLD: 0.53 K/UL (ref 0–1)
MONOCYTES NFR BLD: 4.3 % (ref 5–13)
NEUTS SEG # BLD: 10.3 K/UL (ref 1.8–8)
NEUTS SEG NFR BLD: 82.6 % (ref 32–75)
NRBC # BLD: 0 K/UL (ref 0–0.01)
NRBC BLD-RTO: 0 PER 100 WBC
PLATELET # BLD AUTO: 349 K/UL (ref 150–400)
PMV BLD AUTO: 11.8 FL (ref 8.9–12.9)
POTASSIUM SERPL-SCNC: 3.8 MMOL/L (ref 3.5–5.1)
PROT SERPL-MCNC: 7.8 G/DL (ref 6.4–8.2)
RBC # BLD AUTO: 5.25 M/UL (ref 3.8–5.2)
SODIUM SERPL-SCNC: 137 MMOL/L (ref 136–145)
WBC # BLD AUTO: 12.5 K/UL (ref 3.6–11)

## 2025-03-18 ENCOUNTER — TELEPHONE (OUTPATIENT)
Facility: CLINIC | Age: 60
End: 2025-03-18

## 2025-03-18 NOTE — TELEPHONE ENCOUNTER
Pt states pcp wants her to notify her when she starts physical therapy. States she has an appt with [facility] in [place] tomorrow at 1.  Pt will like her referral to be sent there. Please advise.

## 2025-03-20 DIAGNOSIS — J45.909 UNSPECIFIED ASTHMA, UNCOMPLICATED: ICD-10-CM

## 2025-03-20 RX ORDER — MONTELUKAST SODIUM 10 MG/1
10 TABLET ORAL DAILY
Qty: 90 TABLET | Refills: 1 | Status: SHIPPED | OUTPATIENT
Start: 2025-03-20 | End: 2025-03-22 | Stop reason: SDUPTHER

## 2025-03-21 ENCOUNTER — OFFICE VISIT (OUTPATIENT)
Facility: CLINIC | Age: 60
End: 2025-03-21

## 2025-03-21 VITALS
BODY MASS INDEX: 48.82 KG/M2 | TEMPERATURE: 97.7 F | SYSTOLIC BLOOD PRESSURE: 143 MMHG | HEART RATE: 73 BPM | RESPIRATION RATE: 18 BRPM | WEIGHT: 293 LBS | DIASTOLIC BLOOD PRESSURE: 69 MMHG | OXYGEN SATURATION: 95 % | HEIGHT: 65 IN

## 2025-03-21 DIAGNOSIS — J45.909 UNCOMPLICATED ASTHMA, UNSPECIFIED ASTHMA SEVERITY, UNSPECIFIED WHETHER PERSISTENT: ICD-10-CM

## 2025-03-21 DIAGNOSIS — E11.9 TYPE 2 DIABETES MELLITUS WITHOUT COMPLICATION, WITHOUT LONG-TERM CURRENT USE OF INSULIN: ICD-10-CM

## 2025-03-21 DIAGNOSIS — M79.602 PAIN OF LEFT UPPER EXTREMITY: ICD-10-CM

## 2025-03-21 DIAGNOSIS — F33.1 MODERATE EPISODE OF RECURRENT MAJOR DEPRESSIVE DISORDER (HCC): ICD-10-CM

## 2025-03-21 DIAGNOSIS — I10 BENIGN HYPERTENSION: ICD-10-CM

## 2025-03-21 DIAGNOSIS — M54.2 NECK PAIN: Primary | ICD-10-CM

## 2025-03-21 DIAGNOSIS — E78.2 MIXED HYPERLIPIDEMIA: ICD-10-CM

## 2025-03-21 DIAGNOSIS — M67.922 TENDINOPATHY OF ELBOW, LEFT: ICD-10-CM

## 2025-03-21 NOTE — PROGRESS NOTES
Chief Complaint   Patient presents with    1 Month Follow-Up         \"Have you been to the ER, urgent care clinic since your last visit?  Hospitalized since your last visit?\"    NO    “Have you seen or consulted any other health care providers outside of [facility] since your last visit?”    NO            Click Here for Release of Records Request     Health Maintenance Due   Topic Date Due    Pneumococcal 50+ years Vaccine (1 of 2 - PCV) Never done    Shingles vaccine (1 of 2) Never done    Hepatitis B vaccine (3 of 3 - 19+ 3-dose series) 10/18/2023    Flu vaccine (1) Never done    Diabetic foot exam  01/24/2025     
Social History:     Past Medical History:   Diagnosis Date    Allergic rhinitis     Asthma     Depression     Fatty liver     GERD (gastroesophageal reflux disease)     Hyperlipidemia     Hypertension     Insomnia     trazodone, melatonin    Migraine headache     ALLEN (obstructive sleep apnea)     Type 2 diabetes mellitus without complication, without long-term current use of insulin ([facility]) 01/10/2023    Vitamin D deficiency       Past Surgical History:   Procedure Laterality Date    APPENDECTOMY      CHOLECYSTECTOMY      GI      anal fistulectomy    GYN      salpingectomy for tubal ectopic pregnancy, BTL       Current Outpatient Medications on File Prior to Visit   Medication Sig Dispense Refill    dulaglutide (TRULICITY) 0.75 MG/0.5ML SOAJ SC injection Inject 0.5 mLs into the skin every 7 days 2 mL 1    vitamin D (CHOLECALCIFEROL) 50 MCG (2000 UT) TABS tablet Take 1 tablet by mouth daily 30 tablet 5    albuterol sulfate HFA (PROVENTIL;VENTOLIN;PROAIR) 108 (90 Base) MCG/ACT inhaler Inhale 2 puffs into the lungs every 4 hours as needed for Wheezing or Shortness of Breath 1 each 2    diclofenac sodium (VOLTAREN) 1 % GEL Apply 2 g topically      Lancets MISC daily       No current facility-administered medications on file prior to visit.       Patient Active Problem List   Diagnosis    Depression    Migraine headache    Asthma    Obstructive sleep apnea (adult) (pediatric)    Type 2 diabetes mellitus without complication, without long-term current use of insulin ([facility]CC)    Benign hypertension    Insomnia    Class 3 severe obesity with serious comorbidity in adult    Anxiety    Acid reflux    Fatty liver    Hyperlipidemia    Prolapsed internal hemorrhoids, grade 2    Seasonal allergic rhinitis    Vitamin D deficiency    NSVT (nonsustained ventricular tachycardia) ([facility])    Moderate episode of recurrent major depressive disorder ([facility])    Body mass index [BMI] 50.0-59.9, adult (Z68.43)       Social History     Socioeconomic

## 2025-03-21 NOTE — ASSESSMENT & PLAN NOTE
Chronic, near goal (stable), continue current treatment plan, lab results and schedule of future lab studies reviewed with patient, and reviewed medications and side effects in detail    Hemoglobin A1C, POC   Date Value Ref Range Status   02/27/2025 8.4 % Final     Comment:     last entry for 2/6/25 - computer down       Orders:     DIABETES FOOT EXAM

## 2025-03-22 RX ORDER — MONTELUKAST SODIUM 10 MG/1
10 TABLET ORAL DAILY
Qty: 90 TABLET | Refills: 1 | Status: SHIPPED | OUTPATIENT
Start: 2025-03-22 | End: 2026-03-17

## 2025-03-22 RX ORDER — AMLODIPINE BESYLATE 10 MG/1
10 TABLET ORAL DAILY
Qty: 90 TABLET | Refills: 1 | Status: SHIPPED | OUTPATIENT
Start: 2025-03-22

## 2025-03-22 RX ORDER — LOSARTAN POTASSIUM 100 MG/1
TABLET ORAL
Qty: 90 TABLET | Refills: 1 | Status: SHIPPED | OUTPATIENT
Start: 2025-03-22

## 2025-03-22 RX ORDER — BUDESONIDE AND FORMOTEROL FUMARATE DIHYDRATE 160; 4.5 UG/1; UG/1
2 AEROSOL RESPIRATORY (INHALATION) 2 TIMES DAILY
Qty: 10.2 G | Refills: 5 | Status: SHIPPED | OUTPATIENT
Start: 2025-03-22

## 2025-03-22 RX ORDER — ROSUVASTATIN CALCIUM 20 MG/1
20 TABLET, COATED ORAL NIGHTLY
Qty: 90 TABLET | Refills: 1 | Status: SHIPPED | OUTPATIENT
Start: 2025-03-22

## 2025-03-22 RX ORDER — GABAPENTIN 300 MG/1
300 CAPSULE ORAL 3 TIMES DAILY
Qty: 90 CAPSULE | Refills: 2 | Status: SHIPPED | OUTPATIENT
Start: 2025-03-22 | End: 2025-06-20

## 2025-03-22 RX ORDER — HYDROCHLOROTHIAZIDE 25 MG/1
25 TABLET ORAL DAILY
Qty: 90 TABLET | Refills: 1 | Status: SHIPPED | OUTPATIENT
Start: 2025-03-22

## 2025-03-22 RX ORDER — BUPROPION HYDROCHLORIDE 300 MG/1
300 TABLET ORAL EVERY MORNING
Qty: 90 TABLET | Refills: 1 | Status: SHIPPED | OUTPATIENT
Start: 2025-03-22

## 2025-03-22 ASSESSMENT — ENCOUNTER SYMPTOMS
SHORTNESS OF BREATH: 0
COUGH: 0

## 2025-03-22 NOTE — ASSESSMENT & PLAN NOTE
Chronic, near goal (stable), continue current treatment plan  Lab Results   Component Value Date    .2 (H) 08/14/2024       Orders:    rosuvastatin (CRESTOR) 20 MG tablet; Take 1 tablet by mouth nightly

## 2025-03-22 NOTE — ASSESSMENT & PLAN NOTE
Chronic, at goal (stable), continue current treatment plan    Orders:    budesonide-formoterol (SYMBICORT) 160-4.5 MCG/ACT AERO; Inhale 2 puffs into the lungs 2 times daily    montelukast (SINGULAIR) 10 MG tablet; Take 1 tablet by mouth daily

## 2025-03-22 NOTE — ASSESSMENT & PLAN NOTE
Chronic, at goal (stable), continue current treatment plan    Orders:    buPROPion (WELLBUTRIN XL) 300 MG extended release tablet; Take 1 tablet by mouth every morning

## 2025-03-22 NOTE — ASSESSMENT & PLAN NOTE
Chronic, improved, near goal (stable), continue current treatment plan    Orders:    amLODIPine (NORVASC) 10 MG tablet; Take 1 tablet by mouth daily    hydroCHLOROthiazide (HYDRODIURIL) 25 MG tablet; Take 1 tablet by mouth daily    losartan (COZAAR) 100 MG tablet; TAKE ONE TABLET BY MOUTH EVERY DAY FOR BLOOD PRESSURE control FOR 90 DAYS

## 2025-03-24 ENCOUNTER — TELEPHONE (OUTPATIENT)
Facility: CLINIC | Age: 60
End: 2025-03-24

## 2025-03-25 NOTE — TELEPHONE ENCOUNTER
Pt states she needs MRI results and Physical Therapy notes to be sent to the [facility] for her short term disability. Please advise.

## 2025-03-25 NOTE — TELEPHONE ENCOUNTER
Advised patient I would refaxed information. She is going to talk to [facility] about sending notes also.

## 2025-04-29 ENCOUNTER — TRANSCRIBE ORDERS (OUTPATIENT)
Facility: HOSPITAL | Age: 60
End: 2025-04-29

## 2025-04-29 DIAGNOSIS — M54.12 CERVICAL RADICULOPATHY: Primary | ICD-10-CM

## 2025-05-12 DIAGNOSIS — E11.9 TYPE 2 DIABETES MELLITUS WITHOUT COMPLICATION, WITHOUT LONG-TERM CURRENT USE OF INSULIN (HCC): ICD-10-CM

## 2025-05-12 RX ORDER — DULAGLUTIDE 0.75 MG/.5ML
INJECTION, SOLUTION SUBCUTANEOUS
Qty: 4 ML | Refills: 5 | Status: SHIPPED | OUTPATIENT
Start: 2025-05-12

## 2025-06-10 ENCOUNTER — OFFICE VISIT (OUTPATIENT)
Facility: CLINIC | Age: 60
End: 2025-06-10
Payer: COMMERCIAL

## 2025-06-10 VITALS
TEMPERATURE: 97 F | SYSTOLIC BLOOD PRESSURE: 148 MMHG | HEIGHT: 65 IN | RESPIRATION RATE: 16 BRPM | DIASTOLIC BLOOD PRESSURE: 74 MMHG | WEIGHT: 293 LBS | OXYGEN SATURATION: 95 % | BODY MASS INDEX: 48.82 KG/M2 | HEART RATE: 74 BPM

## 2025-06-10 DIAGNOSIS — E66.813 OBESITY, CLASS 3 (HCC): ICD-10-CM

## 2025-06-10 DIAGNOSIS — J06.9 VIRAL URI: ICD-10-CM

## 2025-06-10 DIAGNOSIS — E55.9 VITAMIN D DEFICIENCY: ICD-10-CM

## 2025-06-10 DIAGNOSIS — I10 BENIGN HYPERTENSION: ICD-10-CM

## 2025-06-10 DIAGNOSIS — E11.9 TYPE 2 DIABETES MELLITUS WITHOUT COMPLICATION, WITHOUT LONG-TERM CURRENT USE OF INSULIN (HCC): Primary | ICD-10-CM

## 2025-06-10 DIAGNOSIS — M79.602 PAIN OF LEFT UPPER EXTREMITY: ICD-10-CM

## 2025-06-10 PROBLEM — M54.12 CERVICAL RADICULOPATHY: Status: ACTIVE | Noted: 2025-04-16

## 2025-06-10 LAB — HBA1C MFR BLD: 6.9 %

## 2025-06-10 PROCEDURE — 3044F HG A1C LEVEL LT 7.0%: CPT | Performed by: FAMILY MEDICINE

## 2025-06-10 PROCEDURE — 99214 OFFICE O/P EST MOD 30 MIN: CPT | Performed by: FAMILY MEDICINE

## 2025-06-10 PROCEDURE — 3077F SYST BP >= 140 MM HG: CPT | Performed by: FAMILY MEDICINE

## 2025-06-10 PROCEDURE — 83036 HEMOGLOBIN GLYCOSYLATED A1C: CPT | Performed by: FAMILY MEDICINE

## 2025-06-10 PROCEDURE — 3078F DIAST BP <80 MM HG: CPT | Performed by: FAMILY MEDICINE

## 2025-06-10 RX ORDER — SPIRONOLACTONE 25 MG/1
12.5 TABLET ORAL DAILY
Qty: 45 TABLET | Refills: 1 | Status: SHIPPED | OUTPATIENT
Start: 2025-06-10

## 2025-06-10 RX ORDER — DULAGLUTIDE 0.75 MG/.5ML
0.75 INJECTION, SOLUTION SUBCUTANEOUS WEEKLY
Qty: 4 ML | Refills: 5 | Status: SHIPPED | OUTPATIENT
Start: 2025-06-10

## 2025-06-10 RX ORDER — GABAPENTIN 300 MG/1
300 CAPSULE ORAL 3 TIMES DAILY
Qty: 90 CAPSULE | Refills: 2 | Status: SHIPPED | OUTPATIENT
Start: 2025-06-10 | End: 2025-09-08

## 2025-06-10 RX ORDER — CHOLECALCIFEROL (VITAMIN D3) 50 MCG
2000 TABLET ORAL DAILY
Qty: 30 TABLET | Refills: 5 | Status: SHIPPED | OUTPATIENT
Start: 2025-06-10

## 2025-06-10 ASSESSMENT — ENCOUNTER SYMPTOMS
SHORTNESS OF BREATH: 0
COUGH: 1
SORE THROAT: 1

## 2025-06-10 NOTE — PROGRESS NOTES
Chief Complaint   Patient presents with    3 Month Follow-Up        \"Have you been to the ER, urgent care clinic since your last visit?  Hospitalized since your last visit?\"    NO    “Have you seen or consulted any other health care providers outside of [facility] since your last visit?”    [facility]             Click Here for Release of Records Request     Health Maintenance Due   Topic Date Due    Pneumococcal 50+ years Vaccine (1 of 2 - PCV) Never done    Shingles vaccine (1 of 2) Never done    Hepatitis B vaccine (3 of 3 - 19+ 3-dose series) 10/18/2023

## 2025-06-10 NOTE — PROGRESS NOTES
[facility]  Chief Complaint   Patient presents with    3 Month Follow-Up    Cough     Cough, sore throat, chest congestion x2 days        History of Present Illness:   Heike Matthews is a 59 y.o. female       HPI:  Here for f/u DM, HTN. I recently increased amlodipine. Back on Trulicity now. FSBS in 120s.     Neck and elbow pain is better on gabapentin.  I started her on gabapentin 300 mg bid and increased it to tid.  Saw ortho and was given injection, order MRI and referring to different ortho per patient.  MRI shows mild common extensor tendinopathy. MRI ordered of cervical spine has not been done.     She did OT therapy and OT recommended PT--?radicular symptoms.      Sick x few days. H/o asthma      Hemoglobin A1C   Date Value Ref Range Status   08/14/2024 7.1 (H) 4.0 - 5.6 % Final     Comment:     (NOTE)  HbA1C Interpretive Ranges  <5.7              Normal  5.7 - 6.4         Consider Prediabetes  >6.5              Consider Diabetes       Hemoglobin A1C, POC   Date Value Ref Range Status   06/10/2025 6.9 % Final         Health Maintenance  Health Maintenance Due   Topic Date Due    Pneumococcal 50+ years Vaccine (1 of 2 - PCV) Never done    Shingles vaccine (1 of 2) Never done    Hepatitis B vaccine (3 of 3 - 19+ 3-dose series) 10/18/2023       Past Medical, Family, and Social History:     Past Medical History:   Diagnosis Date    Allergic rhinitis     Asthma     Depression     Fatty liver     GERD (gastroesophageal reflux disease)     Hyperlipidemia     Hypertension     Insomnia     trazodone, melatonin    Migraine headache     ALLEN (obstructive sleep apnea)     Type 2 diabetes mellitus without complication, without long-term current use of insulin (HCC) 01/10/2023    Vitamin D deficiency       Past Surgical History:   Procedure Laterality Date    APPENDECTOMY      CHOLECYSTECTOMY      GI      anal fistulectomy    GYN      salpingectomy for tubal ectopic pregnancy, BTL       Current

## 2025-06-10 NOTE — ASSESSMENT & PLAN NOTE
Chronic, at goal (stable), continue current treatment plan, check labs in 3 months    Orders:    spironolactone (ALDACTONE) 25 MG tablet; Take 0.5 tablets by mouth daily

## 2025-06-10 NOTE — ASSESSMENT & PLAN NOTE
Chronic, at goal (stable), continue current treatment plan    Orders:    COLLECTION CAPILLARY BLOOD SPECIMEN    AMB POC HEMOGLOBIN A1C    dulaglutide (TRULICITY) 0.75 MG/0.5ML SOAJ SC injection; Inject 0.5 mLs into the skin once a week

## 2025-06-10 NOTE — ASSESSMENT & PLAN NOTE
Lab Results   Component Value Date/Time    VITD25 30.8 02/28/2025 11:55 AM          Orders:    vitamin D (CHOLECALCIFEROL) 50 MCG (2000 UT) TABS tablet; Take 1 tablet by mouth daily

## 2025-06-15 DIAGNOSIS — E11.9 TYPE 2 DIABETES MELLITUS WITHOUT COMPLICATION, WITHOUT LONG-TERM CURRENT USE OF INSULIN (HCC): ICD-10-CM

## 2025-06-16 RX ORDER — DULAGLUTIDE 0.75 MG/.5ML
INJECTION, SOLUTION SUBCUTANEOUS
Qty: 4 ML | Refills: 0 | OUTPATIENT
Start: 2025-06-16

## 2025-06-17 DIAGNOSIS — E11.9 TYPE 2 DIABETES MELLITUS WITHOUT COMPLICATION, WITHOUT LONG-TERM CURRENT USE OF INSULIN (HCC): ICD-10-CM

## 2025-06-17 RX ORDER — DULAGLUTIDE 0.75 MG/.5ML
INJECTION, SOLUTION SUBCUTANEOUS
Qty: 4 ML | Refills: 0 | Status: SHIPPED | OUTPATIENT
Start: 2025-06-17

## 2025-06-17 NOTE — TELEPHONE ENCOUNTER
dulaglutide (TRULICITY) 0.75 MG/0.5ML SOAJ SC injection   Pt will like med refilled, please send to [facility]. Please advise.

## 2025-07-14 DIAGNOSIS — E11.9 TYPE 2 DIABETES MELLITUS WITHOUT COMPLICATION, WITHOUT LONG-TERM CURRENT USE OF INSULIN (HCC): ICD-10-CM

## 2025-07-15 RX ORDER — DULAGLUTIDE 0.75 MG/.5ML
INJECTION, SOLUTION SUBCUTANEOUS
Qty: 4 ML | Refills: 5 | Status: SHIPPED | OUTPATIENT
Start: 2025-07-15

## 2025-08-18 DIAGNOSIS — J45.909 UNCOMPLICATED ASTHMA, UNSPECIFIED ASTHMA SEVERITY, UNSPECIFIED WHETHER PERSISTENT: ICD-10-CM

## 2025-08-18 RX ORDER — ALBUTEROL SULFATE 90 UG/1
2 INHALANT RESPIRATORY (INHALATION) EVERY 4 HOURS PRN
Qty: 8.5 G | Refills: 2 | Status: SHIPPED | OUTPATIENT
Start: 2025-08-18